# Patient Record
Sex: FEMALE | Race: WHITE | Employment: FULL TIME | URBAN - METROPOLITAN AREA
[De-identification: names, ages, dates, MRNs, and addresses within clinical notes are randomized per-mention and may not be internally consistent; named-entity substitution may affect disease eponyms.]

---

## 2017-02-15 ENCOUNTER — HOSPITAL ENCOUNTER (OUTPATIENT)
Dept: LAB | Age: 42
Discharge: HOME | End: 2017-02-15
Payer: COMMERCIAL

## 2017-02-15 ENCOUNTER — OFFICE VISIT (OUTPATIENT)
Dept: FAMILY MEDICINE CLINIC | Age: 42
End: 2017-02-15

## 2017-02-15 VITALS
DIASTOLIC BLOOD PRESSURE: 68 MMHG | BODY MASS INDEX: 40.78 KG/M2 | RESPIRATION RATE: 16 BRPM | HEIGHT: 62 IN | WEIGHT: 221.6 LBS | TEMPERATURE: 97 F | SYSTOLIC BLOOD PRESSURE: 114 MMHG | OXYGEN SATURATION: 98 % | HEART RATE: 70 BPM

## 2017-02-15 DIAGNOSIS — Z90.5 SOLITARY KIDNEY, ACQUIRED: ICD-10-CM

## 2017-02-15 DIAGNOSIS — Z51.89 ENCOUNTER FOR PATIENT COMPLIANCE MONITORING IN DRUG TREATMENT PROGRAM: Primary | ICD-10-CM

## 2017-02-15 LAB
ANION GAP BLD CALC-SCNC: 7 MMOL/L (ref 3–18)
BUN SERPL-MCNC: 15 MG/DL (ref 7–18)
BUN/CREAT SERPL: 17 (ref 12–20)
CALCIUM SERPL-MCNC: 8.5 MG/DL (ref 8.5–10.1)
CHLORIDE SERPL-SCNC: 105 MMOL/L (ref 100–108)
CO2 SERPL-SCNC: 28 MMOL/L (ref 21–32)
CREAT SERPL-MCNC: 0.89 MG/DL (ref 0.6–1.3)
GLUCOSE SERPL-MCNC: 83 MG/DL (ref 74–99)
POTASSIUM SERPL-SCNC: 3.9 MMOL/L (ref 3.5–5.5)
SODIUM SERPL-SCNC: 140 MMOL/L (ref 136–145)

## 2017-02-15 PROCEDURE — 80048 BASIC METABOLIC PNL TOTAL CA: CPT | Performed by: FAMILY MEDICINE

## 2017-02-15 PROCEDURE — 36415 COLL VENOUS BLD VENIPUNCTURE: CPT | Performed by: FAMILY MEDICINE

## 2017-02-15 RX ORDER — ALPRAZOLAM 1 MG/1
1 TABLET ORAL AS NEEDED
Qty: 30 TAB | Refills: 2 | Status: SHIPPED | OUTPATIENT
Start: 2017-02-15 | End: 2017-02-16 | Stop reason: SDUPTHER

## 2017-02-15 NOTE — PATIENT INSTRUCTIONS

## 2017-02-15 NOTE — MR AVS SNAPSHOT
Visit Information Date & Time Provider Department Dept. Phone Encounter #  
 2/15/2017 11:00 AM Israel Parra MD [address] [facility] [phone] [id number] Follow-up Instructions Return in about 3 months (around 5/15/2017), or if symptoms worsen or fail to improve. Your Appointments 4/11/2017  9:00 AM  
ESTABLISHED PATIENT with Caden Khanna MD  
[facility]. [address] ([facility]) Appt Note: 3mos f/u stress incontinence [address]  
  
   
 [facility] [phone] [id number]  
  
    
 5/16/2017 11:00 AM  
Follow Up with Israel Parra MD  
[address] [place] [address] ([facility]) Appt Note: 3 mon f/u alix 2/15  
 [address] [facility] [address] [place], [place] [id number]  
[phone]  
  
   
 [address] [address] Upcoming Health Maintenance Date Due DTaP/Tdap/Td series (1 - Tdap) 4/30/1994 PAP AKA CERVICAL CYTOLOGY 4/30/1994 INFLUENZA AGE 9 TO ADULT 8/1/2016 Allergies as of 2/15/2017  Review Complete On: 2/15/2017 By: Ab Bob LPN Severity Noted Reaction Type Reactions Morphine  09/21/2016    Other (comments) GI DISTRESS Sulfa (Sulfonamide Antibiotics)  09/21/2016    Other (comments) GI DISTRESS Sulfamethoxazole-trimethoprim  01/01/2017    Unknown (comments) Vicodin [Hydrocodone-acetaminophen]  09/21/2016    Other (comments) Psychological Reaction Current Immunizations  Never Reviewed No immunizations on file. Not reviewed this visit You Were Diagnosed With   
  
 Codes Comments Encounter for patient compliance monitoring in drug treatment program    -  Primary ICD-10-CM: Z51.89 ICD-9-CM: V72.85 Solitary kidney, acquired     ICD-10-CM: Z90.5 ICD-9-CM: V45.73 Vitals BP Pulse Temp Resp Height(growth percentile) Weight(growth percentile) 114/68 (BP 1 Location: Right arm, BP Patient Position: Sitting) 70 97 °F (36.1 °C) (Oral) 16 5' 2\" (1.575 m) 221 lb 9.6 oz (100.5 kg) SpO2 BMI OB Status Smoking Status 98% 40.53 kg/m2 Hysterectomy Never Smoker BMI and BSA Data Body Mass Index Body Surface Area 40.53 kg/m 2 2.1 m 2 Preferred Pharmacy Pharmacy Name Phone [facility] [address] [phone] Your Updated Medication List  
  
   
This list is accurate as of: 2/15/17 11:23 AM.  Always use your most recent med list.  
  
  
  
  
 albuterol 90 mcg/actuation inhaler Commonly known as:  PROVENTIL HFA, VENTOLIN HFA, PROAIR HFA  
1-2 Puffs. ALPRAZolam 1 mg tablet Commonly known as:  Jody Jace Take 1 Tab by mouth as needed. ibuprofen 600 mg tablet Commonly known as:  MOTRIN  
600 mg. Prescriptions Printed Refills ALPRAZolam (XANAX) 1 mg tablet 2 Sig: Take 1 Tab by mouth as needed. Class: Print Route: Oral  
  
We Performed the Following DRUG SCREEN, URINE [10103 CPT(R)] Follow-up Instructions Return in about 3 months (around 5/15/2017), or if symptoms worsen or fail to improve. To-Do List   
 02/15/2017 Lab:  DRUG SCREEN, URINE Patient Instructions Anxiety Disorder: Care Instructions Your Care Instructions Anxiety is a normal reaction to stress. Difficult situations can cause you to have symptoms such as sweaty palms and a nervous feeling. In an anxiety disorder, the symptoms are far more severe. Constant worry, muscle tension, trouble sleeping, nausea and diarrhea, and other symptoms can make normal daily activities difficult or impossible. These symptoms may occur for no reason, and they can affect your work, school, or social life. Medicines, counseling, and self-care can all help. Follow-up care is a key part of your treatment and safety. Be sure to make and go to all appointments, and call your doctor if you are having problems. It's also a good idea to know your test results and keep a list of the medicines you take. How can you care for yourself at home? · Take medicines exactly as directed. Call your doctor if you think you are having a problem with your medicine. · Go to your counseling sessions and follow-up appointments. · Recognize and accept your anxiety. Then, when you are in a situation that makes you anxious, say to yourself, \"This is not an emergency. I feel uncomfortable, but I am not in danger. I can keep going even if I feel anxious. \" · Be kind to your body: ¨ Relieve tension with exercise or a massage. ¨ Get enough rest. 
¨ Avoid alcohol, caffeine, nicotine, and illegal drugs. They can increase your anxiety level and cause sleep problems. ¨ Learn and do relaxation techniques. See below for more about these techniques. · Engage your mind. Get out and do something you enjoy. Go to a [employer] movie, or take a walk or hike. Plan your day. Having too much or too little to do can make you anxious. · Keep a record of your symptoms. Discuss your fears with a good friend or family member, or join a support group for people with similar problems. Talking to others sometimes relieves stress. · Get involved in social groups, or volunteer to help others. Being alone sometimes makes things seem worse than they are. · Get at least 30 minutes of exercise on most days of the week to relieve stress. Walking is a good choice. You also may want to do other activities, such as running, swimming, cycling, or playing tennis or team sports. Relaxation techniques Do relaxation exercises 10 to 20 minutes a day. You can play soothing, relaxing music while you do them, if you wish. · Tell others in your house that you are going to do your relaxation exercises. Ask them not to disturb you. · Find a comfortable place, away from all distractions and noise. · Lie down on your back, or sit with your back straight. · Focus on your breathing. Make it slow and steady. · Breathe in through your nose. Breathe out through either your nose or mouth. · Breathe deeply, filling up the area between your navel and your rib cage. Breathe so that your belly goes up and down. · Do not hold your breath. · Breathe like this for 5 to 10 minutes. Notice the feeling of calmness throughout your whole body. As you continue to breathe slowly and deeply, relax by doing the following for another 5 to 10 minutes: · Tighten and relax each muscle group in your body. You can begin at your toes and work your way up to your head. · Imagine your muscle groups relaxing and becoming heavy. · Empty your mind of all thoughts. · Let yourself relax more and more deeply. · Become aware of the state of calmness that surrounds you. · When your relaxation time is over, you can bring yourself back to alertness by moving your fingers and toes and then your hands and feet and then stretching and moving your entire body. Sometimes people fall asleep during relaxation, but they usually wake up shortly afterward. · Always give yourself time to return to full alertness before you drive a car or do anything that might cause an accident if you are not fully alert. Never play a relaxation tape while you drive a car. When should you call for help? Call 911 anytime you think you may need emergency care. For example, call if: 
· You feel you cannot stop from hurting yourself or someone else. Keep the numbers for these national suicide hotlines: 4-107-927-TALK (4-702.953.1126) and 4-095-KBVKQSH (1-348.844.9226). If you or someone you know talks about suicide or feeling hopeless, get help right away. Watch closely for changes in your health, and be sure to contact your doctor if: 
· You have anxiety or fear that affects your life. · You have symptoms of anxiety that are new or different from those you had before. Where can you learn more? Go to http://roro-kenyatta.info/. Enter P754 in the search box to learn more about \"Anxiety Disorder: Care Instructions. \" Current as of: July 26, 2016 Content Version: 11.1 © 3279-7828 [employer]. Care instructions adapted under license by [employer] (which disclaims liability or warranty for this information). If you have questions about a medical condition or this instruction, always ask your healthcare professional. [address] any warranty or liability for your use of this information. Introducing [facility]! Gaviota Bryant introduces [employer] patient portal. Now you can access parts of your medical record, email your doctor's office, and request medication refills online. 1. In your internet browser, go to [email or web address] 2. Click on the First Time User? Click Here link in the Sign In box. You will see the New Member Sign Up page. 3. Enter your [employer] Access Code exactly as it appears below. You will not need to use this code after youve completed the sign-up process. If you do not sign up before the expiration date, you must request a new code. · [employer] Access Code: [email or web address] Expires: 4/19/2017 10:37 AM 
 
4. Enter the last four digits of your Social Security Number (xxxx) and Date of Birth (mm/dd/yyyy) as indicated and click Submit. You will be taken to the next sign-up page. 5. Create a [employer] ID. This will be your [employer] login ID and cannot be changed, so think of one that is secure and easy to remember. 6. Create a [employer] password. You can change your password at any time. 7. Enter your Password Reset Question and Answer. This can be used at a later time if you forget your password. 8. Enter your e-mail address.  You will receive e-mail notification when new information is available in [employer]. 9. Click Sign Up. You can now view and download portions of your medical record. 10. Click the Download Summary menu link to download a portable copy of your medical information. If you have questions, please visit the Frequently Asked Questions section of the [employer] website. Remember, [employer] is NOT to be used for urgent needs. For medical emergencies, dial 911. Now available from your iPhone and Android! Please provide this summary of care documentation to your next provider. Your primary care clinician is listed as Avinash Small. If you have any questions after today's visit, please call [phone].

## 2017-02-15 NOTE — TELEPHONE ENCOUNTER
Pt is calling to let us know that [facility] wasn't able to fill the xanax b/c of missing directions/ dose. Please follow up with the pharmacy and the patient.

## 2017-02-15 NOTE — PROGRESS NOTES
Enio Limon is a 35 y.o.  female and presents for solitary kidney lab monitoring and a UDS for chronic rx for   A controlled substance. Chief Complaint   Patient presents with   [place] Stamp Establish Care     Subjective: Additional Concerns: none    Patient Active Problem List    Diagnosis Date Noted    Urinary incontinence without sensory awareness     Solitary kidney, acquired     Recurrent UTI     Psychiatric disorder     Microhematuria     Stress incontinence     Urinary incontinence      Current Outpatient Prescriptions   Medication Sig Dispense Refill    ALPRAZolam (XANAX) 1 mg tablet Take 1 Tab by mouth as needed. 30 Tab 2    ibuprofen (MOTRIN) 600 mg tablet 600 mg.      albuterol (PROVENTIL HFA, VENTOLIN HFA, PROAIR HFA) 90 mcg/actuation inhaler 1-2 Puffs. Allergies   Allergen Reactions    Morphine Other (comments)     GI DISTRESS       Sulfa (Sulfonamide Antibiotics) Other (comments)     GI DISTRESS     Sulfamethoxazole-Trimethoprim Unknown (comments)    Vicodin [Hydrocodone-Acetaminophen] Other (comments)     Psychological Reaction        Past Medical History   Diagnosis Date    Microhematuria     Psychiatric disorder      depression/  anxiety    Recurrent UTI     Solitary kidney, acquired     Stress incontinence     Urinary incontinence     Urinary incontinence without sensory awareness      Past Surgical History   Procedure Laterality Date    Hx nephrectomy Left 2000     pt. has right kidney    .  Hx hysterectomy  2012     partial hysterectomy    Hx  section      Hx other surgical  2013     Cholecystectomy     Family History   Problem Relation Age of Onset    Elevated Lipids Mother     Hypertension Mother     No Known Problems Father      Social History   Substance Use Topics    Smoking status: Never Smoker    Smokeless tobacco: Never Used    Alcohol use 0.6 oz/week     1 Shots of liquor per week      Comment:  rarely drink   Q 3 mos.      ROS General: negative for - chills, fatigue, fever, weight change  Psych: positive for - anxiety,  Negative for depression, irritability or mood swings  Resp: negative for - cough, shortness of breath or wheezing  CV: negative for - chest pain, edema or palpitations  MSK: negative for - joint pain, joint swelling or muscle pain  Neuro: negative for - confusion, headaches, seizures or weakness    Objective:  Vitals:    02/15/17 1057   BP: 114/68   Pulse: 70   Resp: 16   Temp: 97 °F (36.1 °C)   TempSrc: Oral   SpO2: 98%   Weight: 221 lb 9.6 oz (100.5 kg)   Height: 5' 2\" (1.575 m)   PainSc:   0 - No pain     PE    Alert, well appearing, and in no distress, oriented to person, place, and time, normal appearing weight and overweight  Mental status - alert, oriented to person, place, and time, normal mood, behavior, speech, dress, motor activity, and thought processes  Eyes - pupils equal and reactive, extraocular eye movements intact  Chest - clear to auscultation, no wheezes, rales or rhonchi, symmetric air entry  Heart - normal rate, regular rhythm, normal S1, S2, no murmurs, rubs, clicks or gallops  Extremities - peripheral pulses normal, no pedal edema, no clubbing or cyanosis    LABS   Office Visit on 01/19/2017   Component Date Value Ref Range Status    PVR 01/19/2017 0  cc Final    Color (UA POC) 01/19/2017 Yellow   Final    Clarity (UA POC) 01/19/2017 Clear   Final    Glucose (UA POC) 01/19/2017 Negative  Negative Final    Bilirubin (UA POC) 01/19/2017 Negative  Negative Final    Ketones (UA POC) 01/19/2017 Negative  Negative Final    Specific gravity (UA POC) 01/19/2017 1.020  1.001 - 1.035 Final    Blood (UA POC) 01/19/2017 2+  Negative Final    pH (UA POC) 01/19/2017 5.5  4.6 - 8.0 Final    Protein (UA POC) 01/19/2017 Negative  Negative mg/dL Final    Urobilinogen (UA POC) 01/19/2017 0.2 mg/dL  0.2 - 1 Final    Nitrites (UA POC) 01/19/2017 Negative  Negative Final    Leukocyte esterase (UA POC) 01/19/2017 Negative  Negative Final   Orders Only on 10/24/2016   Component Date Value Ref Range Status    WBC 10/24/2016 7.6  3.4 - 10.8 x10E3/uL Final    RBC 10/24/2016 4.60  x10E6/uL Final    Comment:               No patient age and/or gender provided               Age                Male          Female            0 -  7 days       3.68 - 5.77    3.68 - 5.77            8 - 30 days       3.29 - 5.50    3.29 - 5.50           31 - 90 days       2.72 - 4.84    2.72 - 4.84      91 days - 11 months     3.86 - 5.16    3.86 - 5.16            1 -  7 years      3.96 - 5.30    3.96 - 5.30            8 - 12 years      3.91 - 5.45    3.91 - 5.45               >12 years      4.14 - 5.80    3.77 - 5.28      HGB 10/24/2016 13.6  g/dL Final    Comment:               No patient age and/or gender provided               Age                Male          Female            0 -  7 days       10.7 - 20.5    10.7 - 20.5            8 - 30 days       10.5 - 18.7    10.5 - 18.7           31 - 90 days        8.8 - 14.3     8.8 - 14.3      91 days - 11 months     10.4 - 14.1    10.4 - 14.1            1 -  7 years      10.9 - 14.8    10.9 - 14.8            8 - 12 years      11.7 - 15.7    11.7 - 15.7               >12 years      12.6 - 17.7    11.1 - 15.9      HCT 10/24/2016 40.7  % Final    Comment:               No patient age and/or gender provided               Age                Male          Female            0 -  7 days       31.9 - 57.2    31.9 - 57.2            8 - 30 days       30.7 - 53.7    30.7 - 53.7           31 - 90 days       26.6 - 41.0    26.6 - 41.0      91 days - 11 months     31.0 - 41.0    31.0 - 41.0            1 -  7 years      32.4 - 43.3    32.4 - 43.3            8 - 12 years      34.8 - 45.8    34.8 - 45.8               >12 years      37.5 - 51.0    34.0 - 46.6      MCV 10/24/2016 89  fL Final    Comment:               No patient age and/or gender provided               Age                Male Female            0 -  7 days         79 - 110       79 - 110            8 - 30 days         81 - 109       81 - 109           31 - 90 days         81 -  97       81 -  97      91 days - 11 months       73 -  87       73 -  87            1 -  7 years        76 -  80       75 -  80            8 - 12 years        68 -  91       68 -  80               >12 years        78 -  97       78 -  80      [facility] 10/24/2016 29.6  pg Final    Comment:               No patient age and/or gender provided               Age                Male          Female            0 -  7 days       26.1 - 38.7    26.1 - 38.7            8 - 30 days       27.5 - 37.6    27.5 - 37.6           31 - 90 days       27.1 - 34.0    27.1 - 34.0      91 days - 11 months     24.2 - 30.1    24.2 - 30.1            1 -  7 years      24.6 - 30.7    24.6 - 30.7            8 - 12 years      25.7 - 31.5    25.7 - 31.5               >12 years      26.6 - 33.0    26.6 - 33.0      [facility] 10/24/2016 33.4  g/dL Final    Comment:               No patient age and/or gender provided               Age                Male          Female            0 -  7 days       31.9 - 36.8    31.9 - 36.8            8 - 30 days       32.0 - 36.4    32.0 - 36.4           31 - 90 days       31.9 - 36.0    31.9 - 36.0      91 days - 11 months     31.5 - 36.0    31.5 - 36.0            1 - 12 years      31.7 - 36.0    31.7 - 36.0               >12 years      31.5 - 35.7    31.5 - 35.7      RDW 10/24/2016 13.2  % Final    Comment:               No patient age and/or gender provided               Age                Male          Female            0 -  7 days       12.1 - 16.9    12.1 - 16.9            8 - 30 days       12.3 - 17.4    12.3 - 17.4           31 - 90 days       12.2 - 16.4    12.2 - 16.4      91 days - 11 months     12.2 - 15.8    12.2 - 15.8            1 -  7 years      12.3 - 15.8    12.3 - 15.8            8 - 12 years      12.3 - 15.1    12.3 - 15.1               >12 years 12.3 - 15.4    12.3 - 15.4      PLATELET 64/53/2195 598  150 - 379 x10E3/uL Final    NEUTROPHILS 10/24/2016 66  % Final    Lymphocytes 10/24/2016 25  % Final    MONOCYTES 10/24/2016 6  % Final    EOSINOPHILS 10/24/2016 2  % Final    BASOPHILS 10/24/2016 0  % Final    ABS. NEUTROPHILS 10/24/2016 5.0  x10E3/uL Final    Comment:               No patient age and/or gender provided               Age                Male          Female            0 -  7 days        1.2 -  6.1     1.2 -  6.1            8 - 30 days        1.2 -  4.8     1.2 -  4.8           31 - 90 days        0.8 -  3.8     0.8 -  3.8      91 days - 11 months      1.0 -  4.0     1.0 -  4.0            1 -  7 years       0.9 -  5.4     0.9 -  5.4            8 - 12 years       1.2 -  6.0     1.2 -  6.0               >12 years       1.4 -  7.0     1.4 -  7.0      Abs Lymphocytes 10/24/2016 1.9  x10E3/uL Final    Comment:               No patient age and/or gender provided               Age                Male          Female            0 -  7 days        0.9 -  5.0     0.9 -  5.0            8 - 30 days        0.9 -  9.1     0.9 -  9.1           31 - 90 days        1.2 -  9.2     1.2 -  9.2      91 days - 11 months      2.9 -  9.5     2.9 -  9.5            1 -  7 years       1.6 -  5.9     1.6 -  5.9            8 - 12 years       1.3 -  3.7     1.3 -  3.7               >12 years       0.7 -  3.1     0.7 -  3.1      ABS.  MONOCYTES 10/24/2016 0.4  x10E3/uL Final    Comment:               No patient age and/or gender provided               Age                Male          Female            0 -  7 days        0.2 -  1.3     0.2 -  1.3            8 - 30 days        0.1 -  1.6     0.1 -  1.6           31 - 90 days        0.2 -  1.2     0.2 -  1.2      91 days - 11 months      0.2 -  1.1     0.2 -  1.1            1 -  7 years       0.2 -  1.0     0.2 -  1.0            8 - 12 years       0.1 -  0.8     0.1 -  0.8               >12 years       0.1 -  0.9 0.1 -  0.9      ABS. EOSINOPHILS 10/24/2016 0.1  x10E3/uL Final    Comment:               No patient age and/or gender provided               Age                Male          Female            0 -  7 days        0.0 -  0.6     0.0 -  0.6            8 - 30 days        0.0 -  0.7     0.0 -  0.7      31 days - 11 months      0.0 -  0.4     0.0 -  0.4            1 -  7 years       0.0 -  0.3     0.0 -  0.3                >7 years       0.0 -  0.4     0.0 -  0.4      ABS. BASOPHILS 10/24/2016 0.0  x10E3/uL Final    Comment:               No patient age and/or gender provided               Age                Male          Female            0 -  7 days        0.0 -  0.6     0.0 -  0.6       8 days - 11 months      0.0 -  0.4     0.0 -  0.4            1 - 17 years       0.0 -  0.3     0.0 -  0.3               >17 years       0.0 -  0.2     0.0 -  0.2      IMMATURE GRANULOCYTES 10/24/2016 1  % Final    ABS. IMM. GRANS. 10/24/2016 0.1  x10E3/uL Final    Comment:               No patient age and/or gender provided               Age                Male          Female            0 - 30 days        Not Estab. Not Estab.               >30 days        0.0 -  0.1     0.0 -  0.1  A hand-written panel/profile was received from your office. In  accordance with the [facility] Ambiguous Test Code Policy dated July 1381, we have assigned CBC with Differential/Platelet, Test Code  #[id number] to this request. If this is not the testing you wished to  receive on this specimen, please contact the [facility] to clarify the test order. We  appreciate your business.       Glucose 10/24/2016 109* 65 - 99 mg/dL Final    BUN 10/24/2016 13  mg/dL Final    Comment:               No patient age and/or gender provided               Age                Male          Female            0 - 11 months        3 - 25         3 - 25            1 - 17 years         5 - 25         5 - 21           24 - 44 years 6 - 20         6 - 20           40 - 59 years         6 - 24         6 - 24           61 - 89 years         8 - 32         8 - 27               >89 years        8 - 39        10 - 39      Creatinine 10/24/2016 0.72  mg/dL Final    Comment:               No patient age and/or gender provided               Age                Male          Female            0 - 60 days        . 44 - 1.19     .44 - 1.19      61 days - 11 months      . 17 - 1.18     .17 - 1.18            1 -  2 years       . 19 -  .42     .19 -  .42            3 -  4 years       . 26 -  .51     .26 -  .51            5 -  6 years       . 30 -  .59     .30 -  .59            7 -  8 years       . 37 -  .62     .37 -  .62            9 - 10 years       . 39 -  .70     .39 -  .70           11 - 12 years       . 42 -  .75     .42 -  .75           13 - 14 years       . 49 -  .90     .49 -  .90               >14 years       . 76 - 1.27     .57 - 1.00      GFR est non-AA 10/24/2016 CANCELED  mL/min/1.73 Final-Edited    Comment: Unable to calculate GFR. Age and/or sex not provided or age <19 years  old. Result canceled by the ancillary      GFR est AA 10/24/2016 CANCELED  mL/min/1.73 Final-Edited    Comment: Unable to calculate GFR. Age and/or sex not provided or age <19 years  old.     Result canceled by the ancillary      BUN/Creatinine ratio 10/24/2016 18   Final    Comment:               No patient age and/or gender provided               Age                Male          Female            0 - 17 years         5 - 33         11 - 22           22 - 44 years         6 - 19         10 - 25           44 - 61 years         5 - 21         9 - 21               >59 years        10 - 25        6 - 32      Sodium 10/24/2016 140  136 - 144 mmol/L Final                  **Please note reference interval change**    Potassium 10/24/2016 4.1  3.5 - 5.2 mmol/L Final                  **Please note reference interval change**    Chloride 10/24/2016 102  97 - 106 mmol/L Final **Please note reference interval change**    CO2 10/24/2016 24  18 - 29 mmol/L Final    Calcium 10/24/2016 8.6  mg/dL Final    Comment:               No patient age and/or gender provided               Age                Male          Female            0 - 10 days        8.6 - 10.4     8.6 - 10.4      11 days -  1 year        9.2 - 11.0     9.2 - 11.0            2 - 11 years       9.1 - 10.5     9.1 - 10.5           12 - 17 years       8.9 - 10.4     8.9 - 10.4           18 - 59 years       8.7 - 10.2     8.7 - 10.2               >59 years       8.6 - 10.2     8.7 - 10.3      INR 10/24/2016 1.0   Final    Comment:               No patient age and/or gender provided               Age                Male          Female            0 -  3 days        0.8 -  1.5     0.8 -  1.5       4 days -  6 months      0.8 -  1.4     0.8 -  1.4                >6 months      0.8 -  1.2     0.8 -  1.2  Reference interval is for non-anticoagulated patients. Suggested INR therapeutic range for Vitamin K  antagonist therapy:     Standard Dose (moderate intensity                    therapeutic range):       2.0 - 3.0     Higher intensity therapeutic range       2.5 - 3.5      Prothrombin time 10/24/2016 10.0  sec Final    Comment:               No patient age and/or gender provided               Age                Male          Female            0 -  3 days       10.2 - 15.4    10.2 - 15.4       4 days -  6 months      9.9 - 13.6     9.9 - 13.6     7 months - 17 years       9.7 - 12.3     9.7 - 12.3               >17 years       9.1 - 12.0     9.1 - 12.0      [facility] Default 10/24/2016 Comment   Final    Comment: A hand-written panel/profile was received from your office. In  accordance with the [facility] Ambiguous Test Code Policy dated July 8245, we have completed your order by using the closest currently  or formerly recognized AMA panel.   We have assigned Basic Metabolic  Panel (8), Test Code #[id number] to this request. If this is not the  testing you wished to receive on this specimen, please contact the  [address] [place] [address] Client Inquiry/Technical Services Department to clarify the  test order. We appreciate your business.  Urine Culture, Routine 10/24/2016    Final                    Value:Culture shows less than 10,000 colony forming units of bacteria per  milliliter of urine. This colony count is not generally considered  to be clinically significant. Office Visit on 09/24/2016   Component Date Value Ref Range Status    PVR 09/24/2016 0  cc Final    Color (UA POC) 09/24/2016 Yellow   Final    Clarity (UA POC) 09/24/2016 Clear   Final    Glucose (UA POC) 09/24/2016 Negative  Negative Final    Bilirubin (UA POC) 09/24/2016 Negative  Negative Final    Ketones (UA POC) 09/24/2016 Negative  Negative Final    Specific gravity (UA POC) 09/24/2016 1.025  1.001 - 1.035 Final    Blood (UA POC) 09/24/2016 2+  Negative Final    pH (UA POC) 09/24/2016 5.5  4.6 - 8.0 Final    Protein (UA POC) 09/24/2016 Negative  Negative mg/dL Final    Urobilinogen (UA POC) 09/24/2016 0.2 mg/dL  0.2 - 1 Final    Nitrites (UA POC) 09/24/2016 Negative  Negative Final    Leukocyte esterase (UA POC) 09/24/2016 Negative  Negative Final       TESTS  Results for orders placed or performed in visit on 01/19/17   AMB POC PVR, JACEK,POST-VOID RES,US,NON-IMAGING   Result Value Ref Range    PVR 0 cc   AMB POC URINALYSIS DIP STICK AUTO W/O MICRO   Result Value Ref Range    Color (UA POC) Yellow     Clarity (UA POC) Clear     Glucose (UA POC) Negative Negative    Bilirubin (UA POC) Negative Negative    Ketones (UA POC) Negative Negative    Specific gravity (UA POC) 1.020 1.001 - 1.035    Blood (UA POC) 2+ Negative    pH (UA POC) 5.5 4.6 - 8.0    Protein (UA POC) Negative Negative mg/dL    Urobilinogen (UA POC) 0.2 mg/dL 0.2 - 1    Nitrites (UA POC) Negative Negative    Leukocyte esterase (UA POC) Negative Negative     Assessment/Plan:      1. Encounter for patient compliance monitoring in drug treatment program  - ALPRAZolam (XANAX) 1 mg tablet; Take 1 Tab by mouth as needed. Dispense: 30 Tab; Refill: 2  - DRUG SCREEN UR - W/ CONFIRM ([facility] Only); Future    2. Solitary kidney, acquired  - METABOLIC PANEL, BASIC; Future    Lab review: orders written for new lab studies as appropriate; see orders. I have discussed the diagnosis with the patient and the intended plan as seen in the above orders. The patient has received an after-visit summary and questions were answered concerning future plans. I have discussed medication side effects and warnings with the patient as well. I have reviewed the plan of care with the patient, accepted their input and they are in agreement with the treatment goals. F/U as needed. F/U in 3 months.      Shelly Chung MD.

## 2017-02-16 DIAGNOSIS — Z51.89 ENCOUNTER FOR PATIENT COMPLIANCE MONITORING IN DRUG TREATMENT PROGRAM: ICD-10-CM

## 2017-02-16 LAB
AMPHETAMINES UR QL SCN: NEGATIVE NG/ML
BARBITURATES UR QL SCN: NEGATIVE NG/ML
BENZODIAZ UR QL: NEGATIVE NG/ML
BZE UR QL: NEGATIVE NG/ML
CANNABINOIDS UR QL SCN: NEGATIVE NG/ML
DRUG SCREEN COMMENT:, 753798: NORMAL
OPIATES UR QL: NEGATIVE NG/ML
PCP UR QL: NEGATIVE NG/ML

## 2017-02-16 RX ORDER — ALPRAZOLAM 1 MG/1
1 TABLET ORAL AS NEEDED
Qty: 30 TAB | Refills: 2 | Status: SHIPPED | OUTPATIENT
Start: 2017-02-16 | End: 2017-05-12 | Stop reason: SDUPTHER

## 2017-05-05 ENCOUNTER — OFFICE VISIT (OUTPATIENT)
Dept: FAMILY MEDICINE CLINIC | Age: 42
End: 2017-05-05

## 2017-05-05 ENCOUNTER — HOSPITAL ENCOUNTER (OUTPATIENT)
Dept: LAB | Age: 42
Discharge: HOME | End: 2017-05-05
Payer: COMMERCIAL

## 2017-05-05 VITALS
WEIGHT: 223 LBS | HEIGHT: 62 IN | HEART RATE: 93 BPM | RESPIRATION RATE: 18 BRPM | TEMPERATURE: 96.7 F | DIASTOLIC BLOOD PRESSURE: 73 MMHG | OXYGEN SATURATION: 98 % | BODY MASS INDEX: 41.04 KG/M2 | SYSTOLIC BLOOD PRESSURE: 115 MMHG

## 2017-05-05 DIAGNOSIS — R63.5 WEIGHT GAIN: Primary | ICD-10-CM

## 2017-05-05 DIAGNOSIS — R63.5 WEIGHT GAIN: ICD-10-CM

## 2017-05-05 DIAGNOSIS — Z13.220 SCREENING CHOLESTEROL LEVEL: ICD-10-CM

## 2017-05-05 LAB
BASOPHILS # BLD AUTO: 0 K/UL (ref 0–0.06)
BASOPHILS # BLD: 0 % (ref 0–2)
CHOLEST SERPL-MCNC: 172 MG/DL
DIFFERENTIAL METHOD BLD: ABNORMAL
EOSINOPHIL # BLD: 0.2 K/UL (ref 0–0.4)
EOSINOPHIL NFR BLD: 2 % (ref 0–5)
ERYTHROCYTE [DISTWIDTH] IN BLOOD BY AUTOMATED COUNT: 13.4 % (ref 11.6–14.5)
EST. AVERAGE GLUCOSE BLD GHB EST-MCNC: 114 MG/DL
HBA1C MFR BLD: 5.6 % (ref 4.2–5.6)
HCT VFR BLD AUTO: 48.4 % (ref 35–45)
HDLC SERPL-MCNC: 43 MG/DL (ref 40–60)
HDLC SERPL: 4 {RATIO} (ref 0–5)
HGB BLD-MCNC: 16.1 G/DL (ref 12–16)
LDLC SERPL CALC-MCNC: 89.4 MG/DL (ref 0–100)
LIPID PROFILE,FLP: ABNORMAL
LYMPHOCYTES # BLD AUTO: 22 % (ref 21–52)
LYMPHOCYTES # BLD: 2.9 K/UL (ref 0.9–3.6)
MCH RBC QN AUTO: 29.8 PG (ref 24–34)
MCHC RBC AUTO-ENTMCNC: 33.3 G/DL (ref 31–37)
MCV RBC AUTO: 89.5 FL (ref 74–97)
MONOCYTES # BLD: 0.4 K/UL (ref 0.05–1.2)
MONOCYTES NFR BLD AUTO: 3 % (ref 3–10)
NEUTS SEG # BLD: 9.4 K/UL (ref 1.8–8)
NEUTS SEG NFR BLD AUTO: 73 % (ref 40–73)
PLATELET # BLD AUTO: 273 K/UL (ref 135–420)
PMV BLD AUTO: 10 FL (ref 9.2–11.8)
RBC # BLD AUTO: 5.41 M/UL (ref 4.2–5.3)
TRIGL SERPL-MCNC: 198 MG/DL (ref ?–150)
TSH SERPL DL<=0.05 MIU/L-ACNC: 1.75 UIU/ML (ref 0.36–3.74)
VLDLC SERPL CALC-MCNC: 39.6 MG/DL
WBC # BLD AUTO: 13 K/UL (ref 4.6–13.2)

## 2017-05-05 PROCEDURE — 85025 COMPLETE CBC W/AUTO DIFF WBC: CPT | Performed by: FAMILY MEDICINE

## 2017-05-05 PROCEDURE — 80061 LIPID PANEL: CPT | Performed by: FAMILY MEDICINE

## 2017-05-05 PROCEDURE — 83036 HEMOGLOBIN GLYCOSYLATED A1C: CPT | Performed by: FAMILY MEDICINE

## 2017-05-05 PROCEDURE — 84443 ASSAY THYROID STIM HORMONE: CPT | Performed by: FAMILY MEDICINE

## 2017-05-05 NOTE — PROGRESS NOTES
1. Have you been to the ER, urgent care clinic since your last visit? Hospitalized since your last visit?no    2. Have you seen or consulted any other health care providers outside of the [address] [place] [address] since your last visit? Include any pap smears or colon screening. No    Patient Royer Griggs is a 38 y.o. female presents today for weight management.

## 2017-05-05 NOTE — PATIENT INSTRUCTIONS
Abnormal Weight Gain: Care Instructions  Your Care Instructions  There are two types of weight gain--normal and abnormal. Normal weight gain is usually caused by eating too much or exercising too little. It can also happen as you get older. But abnormal weight gain has other causes. It can be caused by a problem with your thyroid gland, called hypothyroidism. Or it can be caused by a problem with your adrenal glands, called Cushing's syndrome. Or your body could be holding too much fluid because of kidney, liver, or heart problems. In some cases, a medicine you take can cause you to gain weight. You can work with your doctor to find out the cause of your weight gain. You will probably need tests to do this. Follow-up care is a key part of your treatment and safety. Be sure to make and go to all appointments, and call your doctor if you are having problems. It's also a good idea to know your test results and keep a list of the medicines you take. How can you care for yourself at home? · Weigh yourself at the same time every day. It's best to do it first thing in the morning after you empty your bladder. Be sure to always wear the same amount of clothing. · Write down any changes in your weight and the possible causes. Discuss these with your doctor. · Your doctor may want you to change your diet and exercise habits. A good way to lose weight is to reduce calories and increase exercise. · Walking is an easy way to get exercise. Try to walk a little longer every day. You also may want to swim, bike, or do other activities. · Ask your doctor if you should see a dietitian. This is a person who can help you plan meals that work best for your lifestyle. · If your doctor prescribed medicines, take them exactly as prescribed. Call your doctor if you think you are having a problem with your medicine. You will get more details on the specific medicines your doctor prescribes.   When should you call for help?  Watch closely for changes in your health, and be sure to contact your doctor if:  · You do not get better as expected. · You continue to gain weight. Where can you learn more? Go to http://roro-kenyatta.info/. Enter A175 in the search box to learn more about \"Abnormal Weight Gain: Care Instructions. \"  Current as of: October 13, 2016  Content Version: 11.2  © 6486-4404 [employer]. Care instructions adapted under license by [employer] (which disclaims liability or warranty for this information). If you have questions about a medical condition or this instruction, always ask your healthcare professional. [address] any warranty or liability for your use of this information.

## 2017-05-07 NOTE — PROGRESS NOTES
Pls report normal to near normal labs to patient except trig which prob only  Needs some diet and exercise to bring back to normal. F/U FLP in 3-6 months.

## 2017-05-07 NOTE — PROGRESS NOTES
Ronda Gilliland is a 38 y.o. female and presents with weight gain. No metabolic work up yet. No weight loss plan tried yet  Except weight watchers. Chief Complaint   Patient presents with    Weight Loss     Subjective: Additional Concerns: none    Patient Active Problem List    Diagnosis Date Noted    Urinary incontinence without sensory awareness     Solitary kidney, acquired     Recurrent UTI     Psychiatric disorder     Microhematuria     Stress incontinence     Urinary incontinence      Current Outpatient Prescriptions   Medication Sig Dispense Refill    ALPRAZolam (XANAX) 1 mg tablet Take 1 Tab by mouth as needed. Max Daily Amount: 1 mg. 30 Tab 2    ibuprofen (MOTRIN) 600 mg tablet 600 mg.      albuterol (PROVENTIL HFA, VENTOLIN HFA, PROAIR HFA) 90 mcg/actuation inhaler 1-2 Puffs. Allergies   Allergen Reactions    Morphine Other (comments)     GI DISTRESS       Sulfa (Sulfonamide Antibiotics) Other (comments)     GI DISTRESS     Sulfamethoxazole-Trimethoprim Unknown (comments)    Vicodin [Hydrocodone-Acetaminophen] Other (comments)     Psychological Reaction        Past Medical History:   Diagnosis Date    Microhematuria     Psychiatric disorder     depression/  anxiety    Recurrent UTI     Solitary kidney, acquired     Stress incontinence     Urinary incontinence     Urinary incontinence without sensory awareness      Past Surgical History:   Procedure Laterality Date    HX  SECTION      HX HYSTERECTOMY  2012    partial hysterectomy    HX HYSTERECTOMY      HX NEPHRECTOMY Left 2000    pt. has right kidney    .     HX NEPHRECTOMY      HX OTHER SURGICAL  2013    Cholecystectomy     Family History   Problem Relation Age of Onset    Elevated Lipids Mother     Hypertension Mother     No Known Problems Father      Social History   Substance Use Topics    Smoking status: Never Smoker    Smokeless tobacco: Never Used    Alcohol use 0.6 oz/week     1 Shots of liquor per week      Comment:  rarely drink   Q 3 mos. ROS     General: negative for - chills, fatigue, fever, positive weight gain change  Resp: negative for - cough, shortness of breath or wheezing  CV: negative for - chest pain, edema or palpitations  GI: negative for - abdominal pain, change in bowel habits, constipation, diarrhea or nausea/vomiting    Objective:  Vitals:    05/05/17 1122   BP: 115/73   Pulse: 93   Resp: 18   Temp: 96.7 °F (35.9 °C)   TempSrc: Oral   SpO2: 98%   Weight: 223 lb (101.2 kg)   Height: 5' 2.01\" (1.575 m)   PainSc:   0 - No pain     PE    Alert, well appearing, and in no distress, oriented to person, place, and time and overweight  Mental status - alert, oriented to person, place, and time, normal mood, behavior, speech, dress, motor activity, and thought processes  Chest - clear to auscultation, no wheezes, rales or rhonchi, symmetric air entry  Heart - normal rate, regular rhythm, normal S1, S2, no murmurs, rubs, clicks or gallops  Extremities - peripheral pulses normal, no pedal edema, no clubbing or cyanosis    [facility] Outpatient Visit on 05/05/2017   Component Date Value Ref Range Status    LIPID PROFILE 05/05/2017        Final    Cholesterol, total 05/05/2017 172  <200 MG/DL Final    Triglyceride 05/05/2017 198* <150 MG/DL Final    Comment: The drugs N-acetylcysteine (NAC) and  Metamiszole have been found to cause falsely  low results in this chemical assay. Please  be sure to submit blood samples obtained  BEFORE administration of either of these  drugs to assure correct results.       HDL Cholesterol 05/05/2017 43  40 - 60 MG/DL Final    LDL, calculated 05/05/2017 89.4  0 - 100 MG/DL Final    VLDL, calculated 05/05/2017 39.6  MG/DL Final    CHOL/HDL Ratio 05/05/2017 4.0  0 - 5.0   Final    WBC 05/05/2017 13.0  4.6 - 13.2 K/uL Final    RBC 05/05/2017 5.41* 4.20 - 5.30 M/uL Final    HGB 05/05/2017 16.1* 12.0 - 16.0 g/dL Final    HCT 05/05/2017 48.4* 35.0 - 45.0 % Final    MCV 05/05/2017 89.5  74.0 - 97.0 FL Final    MCH 05/05/2017 29.8  24.0 - 34.0 PG Final    MCHC 05/05/2017 33.3  31.0 - 37.0 g/dL Final    RDW 05/05/2017 13.4  11.6 - 14.5 % Final    PLATELET 58/80/6371 523  135 - 420 K/uL Final    MPV 05/05/2017 10.0  9.2 - 11.8 FL Final    NEUTROPHILS 05/05/2017 73  40 - 73 % Final    LYMPHOCYTES 05/05/2017 22  21 - 52 % Final    MONOCYTES 05/05/2017 3  3 - 10 % Final    EOSINOPHILS 05/05/2017 2  0 - 5 % Final    BASOPHILS 05/05/2017 0  0 - 2 % Final    ABS. NEUTROPHILS 05/05/2017 9.4* 1.8 - 8.0 K/UL Final    ABS. LYMPHOCYTES 05/05/2017 2.9  0.9 - 3.6 K/UL Final    ABS. MONOCYTES 05/05/2017 0.4  0.05 - 1.2 K/UL Final    ABS. EOSINOPHILS 05/05/2017 0.2  0.0 - 0.4 K/UL Final    ABS. BASOPHILS 05/05/2017 0.0  0.0 - 0.06 K/UL Final    DF 05/05/2017 AUTOMATED    Final    TSH 05/05/2017 1.75  0.36 - 3.74 uIU/mL Final    Hemoglobin A1c 05/05/2017 5.6  4.2 - 5.6 % Final    Comment: (NOTE)  HbA1C Interpretive Ranges  <5.7              Normal  5.7 - 6.4         Consider Prediabetes  >6.5              Consider Diabetes      Est. average glucose 05/05/2017 114  mg/dL Final    Comment: (NOTE)  The eAG should be interpreted with patient characteristics in mind   since ethnicity, interindividual differences, red cell lifespan,   variation in rates of glycation, etc. may affect the validity of the   calculation.      Office Visit on 04/28/2017   Component Date Value Ref Range Status    Color (UA POC) 04/28/2017 Yellow   Final    Clarity (UA POC) 04/28/2017 Clear   Final    Glucose (UA POC) 04/28/2017 Negative  Negative Final    Bilirubin (UA POC) 04/28/2017 Negative  Negative Final    Ketones (UA POC) 04/28/2017 Negative  Negative Final    Specific gravity (UA POC) 04/28/2017 1.020  1.001 - 1.035 Final    Blood (UA POC) 04/28/2017 3+  Negative Final    Moderate    pH (UA POC) 04/28/2017 5.5  4.6 - 8.0 Final    Protein (UA POC) 04/28/2017 Negative  Negative mg/dL Final    Urobilinogen (UA POC) 04/28/2017 0.2 mg/dL  0.2 - 1 Final    Nitrites (UA POC) 04/28/2017 Negative  Negative Final    Leukocyte esterase (UA POC) 04/28/2017 Negative  Negative Final    PVR 04/28/2017 127  f cc Final   Hospital Outpatient Visit on 02/15/2017   Component Date Value Ref Range Status    Sodium 02/15/2017 140  136 - 145 mmol/L Final    Potassium 02/15/2017 3.9  3.5 - 5.5 mmol/L Final    Chloride 02/15/2017 105  100 - 108 mmol/L Final    CO2 02/15/2017 28  21 - 32 mmol/L Final    Anion gap 02/15/2017 7  3.0 - 18 mmol/L Final    Glucose 02/15/2017 83  74 - 99 mg/dL Final    BUN 02/15/2017 15  7.0 - 18 MG/DL Final    Creatinine 02/15/2017 0.89  0.6 - 1.3 MG/DL Final    BUN/Creatinine ratio 02/15/2017 17  12 - 20   Final    GFR est AA 02/15/2017 >60  >60 ml/min/1.73m2 Final    GFR est non-AA 02/15/2017 >60  >60 ml/min/1.73m2 Final    Comment: (NOTE)  Estimated GFR is calculated using the Modification of Diet in Renal   Disease (MDRD) Study equation, reported for both  Americans   (GFRAA) and non- Americans (GFRNA), and normalized to 1.73m2   body surface area. The physician must decide which value applies to   the patient. The MDRD study equation should only be used in   individuals age 25 or older. It has not been validated for the   following: pregnant women, patients with serious comorbid conditions,   or on certain medications, or persons with extremes of body size,   muscle mass, or nutritional status.  Calcium 02/15/2017 8.5  8.5 - 10.1 MG/DL Final   Office Visit on 02/15/2017   Component Date Value Ref Range Status    Amphetamines, urine 02/15/2017 Negative  Nwehvt=2861 ng/mL Final    Amphetamine test includes Amphetamine and Methamphetamine.     Barbiturates 02/15/2017 Negative  Mqiqki=225 ng/mL Final    Benzodiazepines 02/15/2017 Negative  Aakzmb=870 ng/mL Final    Cannabinoids 02/15/2017 Negative  Cutoff=50 ng/mL Final    Cocaine 02/15/2017 Negative  Jbbaxo=318 ng/mL Final    Opiates 02/15/2017 Negative  Cgejuz=349 ng/mL Final    Opiate test includes Codeine and Morphine only.  Phencyclidine 02/15/2017 Negative  Cutoff=25 ng/mL Final    Drug Screen Comment: 02/15/2017 Comment   Final    Comment: This assay provides a preliminary unconfirmed analytical test result  that may be suitable for the clinical management of patients in  certain situations. For workplace drug testing programs, preliminary  positive findings should always be confirmed by an alternative method. Some over-the-counter medications, as well as adulterants, may cause  inaccurate results. Screen Only testing does not meet the [employer] of  [employer] Forensic Urine Drug Testing Program  requirements as a forensic urine drug test for workplace testing. All  clients must ensure that their testing program conforms to applicable  state and federal laws and employment agreements.      Office Visit on 01/19/2017   Component Date Value Ref Range Status    PVR 01/19/2017 0  cc Final    Color (UA POC) 01/19/2017 Yellow   Final    Clarity (UA POC) 01/19/2017 Clear   Final    Glucose (UA POC) 01/19/2017 Negative  Negative Final    Bilirubin (UA POC) 01/19/2017 Negative  Negative Final    Ketones (UA POC) 01/19/2017 Negative  Negative Final    Specific gravity (UA POC) 01/19/2017 1.020  1.001 - 1.035 Final    Blood (UA POC) 01/19/2017 2+  Negative Final    pH (UA POC) 01/19/2017 5.5  4.6 - 8.0 Final    Protein (UA POC) 01/19/2017 Negative  Negative mg/dL Final    Urobilinogen (UA POC) 01/19/2017 0.2 mg/dL  0.2 - 1 Final    Nitrites (UA POC) 01/19/2017 Negative  Negative Final    Leukocyte esterase (UA POC) 01/19/2017 Negative  Negative Final       TESTS  Results for orders placed or performed in visit on 04/28/17   AMB POC URINALYSIS DIP STICK AUTO W/O MICRO   Result Value Ref Range    Color (UA POC) Yellow     Clarity (UA POC) Clear     Glucose (UA POC) Negative Negative    Bilirubin (UA POC) Negative Negative    Ketones (UA POC) Negative Negative    Specific gravity (UA POC) 1.020 1.001 - 1.035    Blood (UA POC) 3+ Negative    pH (UA POC) 5.5 4.6 - 8.0    Protein (UA POC) Negative Negative mg/dL    Urobilinogen (UA POC) 0.2 mg/dL 0.2 - 1    Nitrites (UA POC) Negative Negative    Leukocyte esterase (UA POC) Negative Negative   AMB POC PVR, JACEK,POST-VOID RES,US,NON-IMAGING   Result Value Ref Range     f cc     Assessment/Plan:      1. Weight gain - Patient given info about PT and medical weigh loss management to include online resources that are free for weight loss. Patient will give us a call if she would like a referral afte calling them first to gather info and ask questions. - CBC WITH AUTOMATED DIFF; Future  - TSH 3RD GENERATION; Future  - HEMOGLOBIN A1C WITH EAG; Future    2. Screening cholesterol level  - LIPID PANEL; Future    Lab review: orders written for new lab studies as appropriate; see orders. I have discussed the diagnosis with the patient and the intended plan as seen in the above orders. The patient has received an after-visit summary and questions were answered concerning future plans. I have discussed medication side effects and warnings with the patient as well. I have reviewed the plan of care with the patient, accepted their input and they are in agreement with the treatment goals. F/U as needed.      Murali Viveros MD

## 2017-05-09 ENCOUNTER — OFFICE VISIT (OUTPATIENT)
Dept: FAMILY MEDICINE CLINIC | Age: 42
End: 2017-05-09

## 2017-05-09 VITALS
TEMPERATURE: 96.9 F | SYSTOLIC BLOOD PRESSURE: 120 MMHG | WEIGHT: 226 LBS | HEIGHT: 62 IN | HEART RATE: 97 BPM | BODY MASS INDEX: 41.59 KG/M2 | RESPIRATION RATE: 17 BRPM | OXYGEN SATURATION: 95 % | DIASTOLIC BLOOD PRESSURE: 70 MMHG

## 2017-05-09 DIAGNOSIS — E66.01 MORBID OBESITY WITH BMI OF 40.0-44.9, ADULT (HCC): Primary | ICD-10-CM

## 2017-05-09 DIAGNOSIS — F41.9 ANXIETY: ICD-10-CM

## 2017-05-09 NOTE — PATIENT INSTRUCTIONS
FOR MEDICALLY MANAGED WEIGHT LOSS:   REPORT TO ORIENTATION BACK AT THIS CLINIC June 1ST 2017  CALL PHONE NUMBER ON CARD PROVIDED FOR MORE INFO ON PROGRAM    ANXIETY:  Consider cognitive behavioral therapy for treatment of anxiety     Consider getting \"When Panic Attacks\" by Dr. Jacey Cerda for an introduction into this type of therapy     DUE FOR PAP SMEAR, SCHEDULE WITH PCP DR. HAQ    Anxiety Disorder: Care Instructions  Your Care Instructions  Anxiety is a normal reaction to stress. Difficult situations can cause you to have symptoms such as sweaty palms and a nervous feeling. In an anxiety disorder, the symptoms are far more severe. Constant worry, muscle tension, trouble sleeping, nausea and diarrhea, and other symptoms can make normal daily activities difficult or impossible. These symptoms may occur for no reason, and they can affect your work, school, or social life. Medicines, counseling, and self-care can all help. Follow-up care is a key part of your treatment and safety. Be sure to make and go to all appointments, and call your doctor if you are having problems. It's also a good idea to know your test results and keep a list of the medicines you take. How can you care for yourself at home? · Take medicines exactly as directed. Call your doctor if you think you are having a problem with your medicine. · Go to your counseling sessions and follow-up appointments. · Recognize and accept your anxiety. Then, when you are in a situation that makes you anxious, say to yourself, \"This is not an emergency. I feel uncomfortable, but I am not in danger. I can keep going even if I feel anxious. \"  · Be kind to your body:  ¨ Relieve tension with exercise or a massage. ¨ Get enough rest.  ¨ Avoid alcohol, caffeine, nicotine, and illegal drugs. They can increase your anxiety level and cause sleep problems. ¨ Learn and do relaxation techniques. See below for more about these techniques. · Engage your mind.  Get out and do something you enjoy. Go to a funny movie, or take a walk or hike. Plan your day. Having too much or too little to do can make you anxious. · Keep a record of your symptoms. Discuss your fears with a good friend or family member, or join a support group for people with similar problems. Talking to others sometimes relieves stress. · Get involved in social groups, or volunteer to help others. Being alone sometimes makes things seem worse than they are. · Get at least 30 minutes of exercise on most days of the week to relieve stress. Walking is a good choice. You also may want to do other activities, such as running, swimming, cycling, or playing tennis or team sports. Relaxation techniques  Do relaxation exercises 10 to 20 minutes a day. You can play soothing, relaxing music while you do them, if you wish. · Tell others in your house that you are going to do your relaxation exercises. Ask them not to disturb you. · Find a comfortable place, away from all distractions and noise. · Lie down on your back, or sit with your back straight. · Focus on your breathing. Make it slow and steady. · Breathe in through your nose. Breathe out through either your nose or mouth. · Breathe deeply, filling up the area between your navel and your rib cage. Breathe so that your belly goes up and down. · Do not hold your breath. · Breathe like this for 5 to 10 minutes. Notice the feeling of calmness throughout your whole body. As you continue to breathe slowly and deeply, relax by doing the following for another 5 to 10 minutes:  · Tighten and relax each muscle group in your body. You can begin at your toes and work your way up to your head. · Imagine your muscle groups relaxing and becoming heavy. · Empty your mind of all thoughts. · Let yourself relax more and more deeply. · Become aware of the state of calmness that surrounds you.   · When your relaxation time is over, you can bring yourself back to alertness by moving your fingers and toes and then your hands and feet and then stretching and moving your entire body. Sometimes people fall asleep during relaxation, but they usually wake up shortly afterward. · Always give yourself time to return to full alertness before you drive a car or do anything that might cause an accident if you are not fully alert. Never play a relaxation tape while you drive a car. When should you call for help? Call 911 anytime you think you may need emergency care. For example, call if:  · You feel you cannot stop from hurting yourself or someone else. Keep the numbers for these national suicide hotlines: 2-074-663-TALK (7-208.557.4474) and 5-154-PSCVMNH (2-250.779.3311). If you or someone you know talks about suicide or feeling hopeless, get help right away. Watch closely for changes in your health, and be sure to contact your doctor if:  · You have anxiety or fear that affects your life. · You have symptoms of anxiety that are new or different from those you had before. Where can you learn more? Go to http://roro-kenyatta.info/. Enter P754 in the search box to learn more about \"Anxiety Disorder: Care Instructions. \"  Current as of: July 26, 2016  Content Version: 11.2  © 0076-5073 [employer]. Care instructions adapted under license by [employer] (which disclaims liability or warranty for this information). If you have questions about a medical condition or this instruction, always ask your healthcare professional. [place] [address] any warranty or liability for your use of this information.

## 2017-05-09 NOTE — PROGRESS NOTES
INTERNAL MEDICINE INITIAL PROVIDER VISIT    SUBJECTIVE:     Chief Complaint   Patient presents with    Weight Loss       HPI: 38 y.o. female with PMHx significant for morbid obesity is here for the above chief complaint(s). Wt loss: interested in medically mgmt wt loss program. Understands process includes coming here, discussed importance of coming to orientation. Being able to \"stick with it\" is biggest fear. No med mgmt wt loss in past. Did weight watchers in past and trials of phen phen. Anxiety/Depressoin: Taking xanax PRN 0.5 mg twice to three times a week. So far helpful in controlling anxiety, with 1 tab get sleepy. Most concerning area of symptoms related to \" worrying too much about different things. \" Feel \"good\" today. Got sleep this weekend. No depressions or thoughts of suicide.  pased away 9/2015. Has support. ROS: 7 point systems review negative except in HPI. Current Outpatient Prescriptions   Medication Sig    ALPRAZolam (XANAX) 1 mg tablet Take 1 Tab by mouth as needed. Max Daily Amount: 1 mg.  ibuprofen (MOTRIN) 600 mg tablet 600 mg.    albuterol (PROVENTIL HFA, VENTOLIN HFA, PROAIR HFA) 90 mcg/actuation inhaler 1-2 Puffs. No current facility-administered medications for this visit.       Health Maintenance   Topic Date Due    DTaP/Tdap/Td series (1 - Tdap) 04/30/1994    PAP AKA CERVICAL CYTOLOGY  04/30/1994    INFLUENZA AGE 9 TO ADULT  08/01/2017     Medications and Allergies: Reviewed and confirmed in the chart    Past Medical Hx: Reviewed and confirmed in the chart  Past Medical History:   Diagnosis Date    Microhematuria     Psychiatric disorder     depression/  anxiety    Recurrent UTI     Solitary kidney, acquired     Stress incontinence     Urinary incontinence     Urinary incontinence without sensory awareness      Family Hx: Reviewed and updated in EMR  Social Hx: Reviewed and updated in EMR    OBJECTIVE:  Vitals:    05/09/17 1415   BP: 120/70 Pulse: 97   Resp: 17   Temp: 96.9 °F (36.1 °C)   TempSrc: Oral   SpO2: 95%   Weight: 226 lb (102.5 kg)   Height: 5' 2\" (1.575 m)   General: Pleasant adult woman in no acute distress  HEENT: Head is atraumatic normo-cephalic. Neuro: Alert and oriented to person, place, time and situation. MSE: Mood euthymic with affect congruent and reactive. Speech normal tone, volume and rate. Good GH. No PMA/R. TP linear with content focused on HPI. Cognition grossly intact, not formally assessed. No SI/HI. Fair insight and judgment. MARTIN-7: 7, not difficult at all  PHQ-9: 5, not difficult at all      [address],  [address] ICD-9-CM    1. Morbid obesity with BMI of 40.0-44.9, adult ([facility] [address] E66.01 278.01 - given contact of  for medical wt loss  - Encouraged to attend orientation June 1st at this clinic    Z68.41 V85.41    2. Anxiety F41.9 300.00 - continue current mgmt  - encouraged CBT therapy, handout given  - encouraged \"when panic attacks\" by Dr. Gordo Hunt, reading on CBT and anxiety  - f/u with PCP as scheduled     3. Future Appointments  Date Time Provider [facility]   5/12/2017 9:30 AM Marielos Rodríguez  N [facility]   5/26/2017 9:00 AM [facility]it Morrissey, [address] [facility]   6/1/2017 10:00 AM ORIENTATION A [facility] SCHED   6/16/2017 10:00 AM Charanjit Morrissey, PT [facility] SCHED   10/27/2017 10:00 AM Mariam Robertson  [address]     4. AVS printed and provided to patient    5. Assessment and plan above discussed with patient, patient voiced understanding and agreement with plan. Bishop Landa M.D.   [address], [address] [place] [address], [address] [place] [address] - [phone]  [place] [phone]

## 2017-05-09 NOTE — MR AVS SNAPSHOT
Visit Information Date & Time Provider Department Dept. Phone Encounter #  
 5/9/2017  3:00 PM Bishop Landa MD [address] [facility] [phone] [id number] Follow-up Instructions Return in about 3 months (around 8/9/2017) for PAP smear [facility] PCP. Routing History Your Appointments 5/9/2017  3:00 PM  
New Patient with Bishop Landa MD  
[address] [facility] [address] [facility]) Appt Note: Est care [address] [facility] [address] [address]  
[phone]  
  
   
 [address]  
  
    
 5/12/2017  9:30 AM  
Follow Up with Marielos Rodríguez MD  
[employer] ([address] [facility]) Appt Note: 3 mon f/u alix 2/15; r/s from 05/16  
 [address] [facility] [address] [facility] [address]  
[phone]  
  
   
 [address] [address]  
  
    
 5/26/2017  9:00 AM  
New Patient with Charanjit Morrissey, PT [facility]. [address] ([address] [facility]) Appt Note: NP - BCBS - NPPW GIVEN  
 [address] [place] [address] [address] [facility] [phone]  
  
    
 6/16/2017 10:00 AM  
PHYSICAL THERAPY with Charanjit Morrissey, PT [facility]. [address] ([address]) Appt Note: BCBS  
 [address] [facility] [address] [place] [address] [address] [phone]  
  
    
 10/27/2017 10:00 AM  
ESTABLISHED PATIENT with Mariam Robertson MD  
[facility]. [address] [place] [address]) Appt Note: 6MO F/UP STRESS INCONT  
 [address] [facility] [address] [place] [address]  
[phone]  
  
   
 [facility] 68 [phone] Upcoming Health Maintenance Date Due DTaP/Tdap/Td series (1 - Tdap) 4/30/1994 PAP AKA CERVICAL CYTOLOGY 4/30/1994 INFLUENZA AGE 9 TO ADULT 8/1/2017 Allergies as of 5/9/2017  Review Complete On: 5/9/2017 By: Juan Francisco Dykes MD  
  
 Severity Noted Reaction Type Reactions Morphine  09/21/2016    Other (comments) GI DISTRESS Sulfa (Sulfonamide Antibiotics)  09/21/2016    Other (comments) GI DISTRESS Sulfamethoxazole-trimethoprim  01/01/2017    Unknown (comments) Vicodin [Hydrocodone-acetaminophen]  09/21/2016    Other (comments) Psychological Reaction Current Immunizations  Never Reviewed No immunizations on file. Not reviewed this visit You Were Diagnosed With   
  
 Codes Comments Morbid obesity with BMI of 40.0-44.9, adult ([facility]    -  Primary ICD-10-CM: E66.01, Z68.41 
ICD-9-CM: 278.01, V85.41 Anxiety     ICD-10-CM: F41.9 ICD-9-CM: 300.00 Vitals BP Pulse Temp Resp Height(growth percentile) Weight(growth percentile) 120/70 (BP 1 Location: Left arm, BP Patient Position: Sitting) 97 96.9 °F (36.1 °C) (Oral) 17 5' 2\" (1.575 m) 226 lb (102.5 kg) SpO2 BMI OB Status Smoking Status 95% 41.34 kg/m2 Hysterectomy Never Smoker Vitals History BMI and BSA Data Body Mass Index Body Surface Area  
 41.34 kg/m 2 2.12 m 2 Preferred Pharmacy Pharmacy Name Phone [facility] [address] [facility] [phone] Your Updated Medication List  
  
   
This list is accurate as of: 5/9/17  2:41 PM.  Always use your most recent med list.  
  
  
  
  
 albuterol 90 mcg/actuation inhaler Commonly known as:  PROVENTIL HFA, VENTOLIN HFA, PROAIR HFA  
1-2 Puffs. ALPRAZolam 1 mg tablet Commonly known as:  Matthew Osei Take 1 Tab by mouth as needed. Max Daily Amount: 1 mg.  
  
 ibuprofen 600 mg tablet Commonly known as:  MOTRIN  
600 mg. Follow-up Instructions Return in about 3 months (around 8/9/2017) for PAP smear [facility] PCP. Patient Instructions FOR MEDICALLY MANAGED WEIGHT LOSS:  
 REPORT TO ORIENTATION BACK AT THIS CLINIC June 1ST 2017 CALL PHONE NUMBER ON CARD PROVIDED FOR MORE INFO ON PROGRAM 
 
ANXIETY: 
Consider cognitive behavioral therapy for treatment of anxiety Consider getting \"When Panic Attacks\" by Dr. Gissel Raphael for an introduction into this type of therapy DUE FOR PAP SMEAR, SCHEDULE WITH PCP DR. Sulema Franco Anxiety Disorder: Care Instructions Your Care Instructions Anxiety is a normal reaction to stress. Difficult situations can cause you to have symptoms such as sweaty palms and a nervous feeling. In an anxiety disorder, the symptoms are far more severe. Constant worry, muscle tension, trouble sleeping, nausea and diarrhea, and other symptoms can make normal daily activities difficult or impossible. These symptoms may occur for no reason, and they can affect your work, school, or social life. Medicines, counseling, and self-care can all help. Follow-up care is a key part of your treatment and safety. Be sure to make and go to all appointments, and call your doctor if you are having problems. It's also a good idea to know your test results and keep a list of the medicines you take. How can you care for yourself at home? · Take medicines exactly as directed. Call your doctor if you think you are having a problem with your medicine. · Go to your counseling sessions and follow-up appointments. · Recognize and accept your anxiety. Then, when you are in a situation that makes you anxious, say to yourself, \"This is not an emergency. I feel uncomfortable, but I am not in danger. I can keep going even if I feel anxious. \" · Be kind to your body: ¨ Relieve tension with exercise or a massage. ¨ Get enough rest. 
¨ Avoid alcohol, caffeine, nicotine, and illegal drugs. They can increase your anxiety level and cause sleep problems. ¨ Learn and do relaxation techniques. See below for more about these techniques. · Engage your mind. Get out and do something you enjoy. Go to a funny movie, or take a walk or hike. Plan your day. Having too much or too little to do can make you anxious. · Keep a record of your symptoms. Discuss your fears with a good friend or family member, or join a support group for people with similar problems. Talking to others sometimes relieves stress. · Get involved in social groups, or volunteer to help others. Being alone sometimes makes things seem worse than they are. · Get at least 30 minutes of exercise on most days of the week to relieve stress. Walking is a good choice. You also may want to do other activities, such as running, swimming, cycling, or playing tennis or team sports. Relaxation techniques Do relaxation exercises 10 to 20 minutes a day. You can play soothing, relaxing music while you do them, if you wish. · Tell others in your house that you are going to do your relaxation exercises. Ask them not to disturb you. · Find a comfortable place, away from all distractions and noise. · Lie down on your back, or sit with your back straight. · Focus on your breathing. Make it slow and steady. · Breathe in through your nose. Breathe out through either your nose or mouth. · Breathe deeply, filling up the area between your navel and your rib cage. Breathe so that your belly goes up and down. · Do not hold your breath. · Breathe like this for 5 to 10 minutes. Notice the feeling of calmness throughout your whole body. As you continue to breathe slowly and deeply, relax by doing the following for another 5 to 10 minutes: · Tighten and relax each muscle group in your body. You can begin at your toes and work your way up to your head. · Imagine your muscle groups relaxing and becoming heavy. · Empty your mind of all thoughts. · Let yourself relax more and more deeply. · Become aware of the state of calmness that surrounds you. · When your relaxation time is over, you can bring yourself back to alertness by moving your fingers and toes and then your hands and feet and then stretching and moving your entire body. Sometimes people fall asleep during relaxation, but they usually wake up shortly afterward. · Always give yourself time to return to full alertness before you drive a car or do anything that might cause an accident if you are not fully alert. Never play a relaxation tape while you drive a car. When should you call for help? Call 911 anytime you think you may need emergency care. For example, call if: 
· You feel you cannot stop from hurting yourself or someone else. Keep the numbers for these national suicide hotlines: 9-928-549-TALK (1-220.359.3093) and 3-671-GFWKLUE (8-502.671.8192). If you or someone you know talks about suicide or feeling hopeless, get help right away. Watch closely for changes in your health, and be sure to contact your doctor if: 
· You have anxiety or fear that affects your life. · You have symptoms of anxiety that are new or different from those you had before. Where can you learn more? Go to http://roro-kenyatta.info/. Enter P754 in the search box to learn more about \"Anxiety Disorder: Care Instructions. \" Current as of: July 26, 2016 Content Version: 11.2 © 5149-1879 [employer]. Care instructions adapted under license by [employer] (which disclaims liability or warranty for this information). If you have questions about a medical condition or this instruction, always ask your healthcare professional. [place] [address] any warranty or liability for your use of this information. Introducing [facility]! Dear Corina Barr: 
Thank you for requesting a Squabbler account. Our records indicate that you already have an active Squabbler account. You can access your account anytime at [email or web address] Did you know that you can access your hospital and ER discharge instructions at any time in Ringly? You can also review all of your test results from your hospital stay or ER visit. Additional Information If you have questions, please visit the Frequently Asked Questions section of the Ringly website at [email or web address] Remember, Ringly is NOT to be used for urgent needs. For medical emergencies, dial 911. Now available from your iPhone and Android! Please provide this summary of care documentation to your next provider. Your primary care clinician is listed as Avinash Small. If you have any questions after today's visit, please call [phone].

## 2017-05-09 NOTE — PROGRESS NOTES
Iris Carrasquillo is a 38 y.o. female presents to office for weight loss.       1. Have you been to the ER, urgent care clinic or hospitalized since your last visit? no          Health Maintenance items with a due date reviewed with patient:  Health Maintenance Due   Topic Date Due    DTaP/Tdap/Td series (1 - Tdap) 04/30/1994    PAP AKA CERVICAL CYTOLOGY  04/30/1994

## 2017-05-10 ENCOUNTER — TELEPHONE (OUTPATIENT)
Dept: FAMILY MEDICINE CLINIC | Age: 42
End: 2017-05-10

## 2017-05-10 NOTE — TELEPHONE ENCOUNTER
----- Message from Josh Foreman MD sent at 5/7/2017  2:04 PM EDT -----  Pls report normal to near normal labs to patient except trig which prob only  Needs some diet and exercise to bring back to normal. F/U FLP in 3-6 months.

## 2017-05-12 ENCOUNTER — OFFICE VISIT (OUTPATIENT)
Dept: FAMILY MEDICINE CLINIC | Age: 42
End: 2017-05-12

## 2017-05-12 VITALS
SYSTOLIC BLOOD PRESSURE: 101 MMHG | OXYGEN SATURATION: 97 % | HEART RATE: 79 BPM | WEIGHT: 225.4 LBS | DIASTOLIC BLOOD PRESSURE: 64 MMHG | BODY MASS INDEX: 41.48 KG/M2 | RESPIRATION RATE: 16 BRPM | TEMPERATURE: 97.6 F | HEIGHT: 62 IN

## 2017-05-12 DIAGNOSIS — Z51.89 ENCOUNTER FOR PATIENT COMPLIANCE MONITORING IN DRUG TREATMENT PROGRAM: ICD-10-CM

## 2017-05-12 RX ORDER — ALPRAZOLAM 1 MG/1
1 TABLET ORAL AS NEEDED
Qty: 30 TAB | Refills: 2 | Status: SHIPPED | OUTPATIENT
Start: 2017-05-12 | End: 2022-10-10

## 2017-05-12 NOTE — PROGRESS NOTES
Lakshmi Chew is a 38 y.o. female presents to office for medication refill. 1. Have you been to the ER, urgent care clinic or hospitalized since your last visit? no  2. Have you seen any other providers outside of [facility] since your last visit? yes  3.  Have you had a Flu shot this year? no      Health Maintenance items with a due date reviewed with patient:  Health Maintenance Due   Topic Date Due    DTaP/Tdap/Td series (1 - Tdap) 04/30/1994    PAP AKA CERVICAL CYTOLOGY  04/30/1994

## 2017-05-12 NOTE — PATIENT INSTRUCTIONS

## 2017-05-14 NOTE — PROGRESS NOTES
Marcus Rosas is a 38 y.o. female and presents with anxiety med refill. Chief Complaint   Patient presents with    Medication Refill     Subjective: Additional Concerns: none     Patient Active Problem List    Diagnosis Date Noted    Morbid obesity with BMI of 40.0-44.9, adult ([facility] 2017    Anxiety 2017    Urinary incontinence without sensory awareness     Solitary kidney, acquired     Recurrent UTI     Psychiatric disorder     Microhematuria     Stress incontinence      Current Outpatient Prescriptions   Medication Sig Dispense Refill    ALPRAZolam (XANAX) 1 mg tablet Take 1 Tab by mouth as needed. Max Daily Amount: 1 mg. 30 Tab 2    ibuprofen (MOTRIN) 600 mg tablet 600 mg.      albuterol (PROVENTIL HFA, VENTOLIN HFA, PROAIR HFA) 90 mcg/actuation inhaler 1-2 Puffs. Allergies   Allergen Reactions    Morphine Other (comments)     GI DISTRESS       Sulfa (Sulfonamide Antibiotics) Other (comments)     GI DISTRESS     Sulfamethoxazole-Trimethoprim Unknown (comments)    Vicodin [Hydrocodone-Acetaminophen] Other (comments)     Psychological Reaction        Past Medical History:   Diagnosis Date    Microhematuria     Psychiatric disorder     depression/  anxiety    Recurrent UTI     Solitary kidney, acquired     Stress incontinence     Urinary incontinence     Urinary incontinence without sensory awareness      Past Surgical History:   Procedure Laterality Date    HX  SECTION      HX HYSTERECTOMY  2012    partial hysterectomy 2/2 painful menses    HX NEPHRECTOMY Left 2000    pt. has right kidney    .     HX OTHER SURGICAL      Cholecystectomy     Family History   Problem Relation Age of Onset    Elevated Lipids Mother     Hypertension Mother     No Known Problems Father      Social History   Substance Use Topics    Smoking status: Never Smoker    Smokeless tobacco: Never Used    Alcohol use 0.6 oz/week     1 Shots of liquor per week      Comment: rarely drink   Q 3 mos. ROS     General: negative for - chills, fatigue, fever, weight change  Psych: positive for - anxiety,no depression, irritability or mood swings  Resp: negative for - cough, shortness of breath or wheezing  CV: negative for - chest pain, edema or palpitations  Neuro: negative for - confusion, headaches, seizures or weakness    Objective:  Vitals:    05/12/17 0929   BP: 101/64   Pulse: 79   Resp: 16   Temp: 97.6 °F (36.4 °C)   TempSrc: Oral   SpO2: 97%   Weight: 225 lb 6.4 oz (102.2 kg)   Height: 5' 2\" (1.575 m)   PainSc:   0 - No pain     PE    alert, well appearing, and in no distress, oriented to person, place, and time and overweight  Mental status - alert, oriented to person, place, and time, normal mood, behavior, speech, dress, motor activity, and thought processes  Chest - clear to auscultation, no wheezes, rales or rhonchi, symmetric air entry  Heart - normal rate, regular rhythm, normal S1, S2, no murmurs, rubs, clicks or gallops  Extremities - peripheral pulses normal, no pedal edema, no clubbing or cyanosis    [address] [facility] Outpatient Visit on 05/05/2017   Component Date Value Ref Range Status    LIPID PROFILE 05/05/2017        Final    Cholesterol, total 05/05/2017 172  <200 MG/DL Final    Triglyceride 05/05/2017 198* <150 MG/DL Final    Comment: The drugs N-acetylcysteine (NAC) and  Metamiszole have been found to cause falsely  low results in this chemical assay. Please  be sure to submit blood samples obtained  BEFORE administration of either of these  drugs to assure correct results.       HDL Cholesterol 05/05/2017 43  40 - 60 MG/DL Final    LDL, calculated 05/05/2017 89.4  0 - 100 MG/DL Final    VLDL, calculated 05/05/2017 39.6  MG/DL Final    CHOL/HDL Ratio 05/05/2017 4.0  0 - 5.0   Final    WBC 05/05/2017 13.0  4.6 - 13.2 K/uL Final    RBC 05/05/2017 5.41* 4.20 - 5.30 M/uL Final    HGB 05/05/2017 16.1* 12.0 - 16.0 g/dL Final    HCT 05/05/2017 48.4* 35.0 - 45.0 % Final    MCV 05/05/2017 89.5  74.0 - 97.0 FL Final    MCH 05/05/2017 29.8  24.0 - 34.0 PG Final    MCHC 05/05/2017 33.3  31.0 - 37.0 g/dL Final    RDW 05/05/2017 13.4  11.6 - 14.5 % Final    PLATELET 69/92/9882 660  135 - 420 K/uL Final    MPV 05/05/2017 10.0  9.2 - 11.8 FL Final    NEUTROPHILS 05/05/2017 73  40 - 73 % Final    LYMPHOCYTES 05/05/2017 22  21 - 52 % Final    MONOCYTES 05/05/2017 3  3 - 10 % Final    EOSINOPHILS 05/05/2017 2  0 - 5 % Final    BASOPHILS 05/05/2017 0  0 - 2 % Final    ABS. NEUTROPHILS 05/05/2017 9.4* 1.8 - 8.0 K/UL Final    ABS. LYMPHOCYTES 05/05/2017 2.9  0.9 - 3.6 K/UL Final    ABS. MONOCYTES 05/05/2017 0.4  0.05 - 1.2 K/UL Final    ABS. EOSINOPHILS 05/05/2017 0.2  0.0 - 0.4 K/UL Final    ABS. BASOPHILS 05/05/2017 0.0  0.0 - 0.06 K/UL Final    DF 05/05/2017 AUTOMATED    Final    TSH 05/05/2017 1.75  0.36 - 3.74 uIU/mL Final    Hemoglobin A1c 05/05/2017 5.6  4.2 - 5.6 % Final    Comment: (NOTE)  HbA1C Interpretive Ranges  <5.7              Normal  5.7 - 6.4         Consider Prediabetes  >6.5              Consider Diabetes      Est. average glucose 05/05/2017 114  mg/dL Final    Comment: (NOTE)  The eAG should be interpreted with patient characteristics in mind   since ethnicity, interindividual differences, red cell lifespan,   variation in rates of glycation, etc. may affect the validity of the   calculation.      Office Visit on 04/28/2017   Component Date Value Ref Range Status    Color (UA POC) 04/28/2017 Yellow   Final    Clarity (UA POC) 04/28/2017 Clear   Final    Glucose (UA POC) 04/28/2017 Negative  Negative Final    Bilirubin (UA POC) 04/28/2017 Negative  Negative Final    Ketones (UA POC) 04/28/2017 Negative  Negative Final    Specific gravity (UA POC) 04/28/2017 1.020  1.001 - 1.035 Final    Blood (UA POC) 04/28/2017 3+  Negative Final    Moderate    pH (UA POC) 04/28/2017 5.5  4.6 - 8.0 Final    Protein (UA POC) 04/28/2017 Negative Negative mg/dL Final    Urobilinogen (UA POC) 04/28/2017 0.2 mg/dL  0.2 - 1 Final    Nitrites (UA POC) 04/28/2017 Negative  Negative Final    Leukocyte esterase (UA POC) 04/28/2017 Negative  Negative Final    PVR 04/28/2017 127  f cc Final   Hospital Outpatient Visit on 02/15/2017   Component Date Value Ref Range Status    Sodium 02/15/2017 140  136 - 145 mmol/L Final    Potassium 02/15/2017 3.9  3.5 - 5.5 mmol/L Final    Chloride 02/15/2017 105  100 - 108 mmol/L Final    CO2 02/15/2017 28  21 - 32 mmol/L Final    Anion gap 02/15/2017 7  3.0 - 18 mmol/L Final    Glucose 02/15/2017 83  74 - 99 mg/dL Final    BUN 02/15/2017 15  7.0 - 18 MG/DL Final    Creatinine 02/15/2017 0.89  0.6 - 1.3 MG/DL Final    BUN/Creatinine ratio 02/15/2017 17  12 - 20   Final    GFR est AA 02/15/2017 >60  >60 ml/min/1.73m2 Final    GFR est non-AA 02/15/2017 >60  >60 ml/min/1.73m2 Final    Comment: (NOTE)  Estimated GFR is calculated using the Modification of Diet in Renal   Disease (MDRD) Study equation, reported for both  Americans   (GFRAA) and non- Americans (GFRNA), and normalized to 1.73m2   body surface area. The physician must decide which value applies to   the patient. The MDRD study equation should only be used in   individuals age 25 or older. It has not been validated for the   following: pregnant women, patients with serious comorbid conditions,   or on certain medications, or persons with extremes of body size,   muscle mass, or nutritional status.  Calcium 02/15/2017 8.5  8.5 - 10.1 MG/DL Final   Office Visit on 02/15/2017   Component Date Value Ref Range Status    Amphetamines, urine 02/15/2017 Negative  Gcmgoj=5041 ng/mL Final    Amphetamine test includes Amphetamine and Methamphetamine.     Barbiturates 02/15/2017 Negative  Tprlzl=111 ng/mL Final    Benzodiazepines 02/15/2017 Negative  Whgnzm=914 ng/mL Final    Cannabinoids 02/15/2017 Negative  Cutoff=50 ng/mL Final    Cocaine 02/15/2017 Negative  Aszhtw=805 ng/mL Final    Opiates 02/15/2017 Negative  Qbmeif=281 ng/mL Final    Opiate test includes Codeine and Morphine only.  Phencyclidine 02/15/2017 Negative  Cutoff=25 ng/mL Final    Drug Screen Comment: 02/15/2017 Comment   Final    Comment: This assay provides a preliminary unconfirmed analytical test result  that may be suitable for the clinical management of patients in  certain situations. For workplace drug testing programs, preliminary  positive findings should always be confirmed by an alternative method. Some over-the-counter medications, as well as adulterants, may cause  inaccurate results. Screen Only testing does not meet the College of  American [employer]ologists Forensic Urine Drug Testing Program  requirements as a forensic urine drug test for workplace testing. All  clients must ensure that their testing program conforms to applicable  state and federal laws and employment agreements.      Office Visit on 01/19/2017   Component Date Value Ref Range Status    PVR 01/19/2017 0  cc Final    Color (UA POC) 01/19/2017 Yellow   Final    Clarity (UA POC) 01/19/2017 Clear   Final    Glucose (UA POC) 01/19/2017 Negative  Negative Final    Bilirubin (UA POC) 01/19/2017 Negative  Negative Final    Ketones (UA POC) 01/19/2017 Negative  Negative Final    Specific gravity (UA POC) 01/19/2017 1.020  1.001 - 1.035 Final    Blood (UA POC) 01/19/2017 2+  Negative Final    pH (UA POC) 01/19/2017 5.5  4.6 - 8.0 Final    Protein (UA POC) 01/19/2017 Negative  Negative mg/dL Final    Urobilinogen (UA POC) 01/19/2017 0.2 mg/dL  0.2 - 1 Final    Nitrites (UA POC) 01/19/2017 Negative  Negative Final    Leukocyte esterase (UA POC) 01/19/2017 Negative  Negative Final       TESTS  Results for orders placed or performed during the hospital encounter of 05/05/17   LIPID PANEL   Result Value Ref Range    LIPID PROFILE          Cholesterol, total 172 <200 MG/DL    Triglyceride 198 (H) <150 MG/DL    HDL Cholesterol 43 40 - 60 MG/DL    LDL, calculated 89.4 0 - 100 MG/DL    VLDL, calculated 39.6 MG/DL    CHOL/HDL Ratio 4.0 0 - 5.0     CBC WITH AUTOMATED DIFF   Result Value Ref Range    WBC 13.0 4.6 - 13.2 K/uL    RBC 5.41 (H) 4.20 - 5.30 M/uL    HGB 16.1 (H) 12.0 - 16.0 g/dL    HCT 48.4 (H) 35.0 - 45.0 %    MCV 89.5 74.0 - 97.0 FL    MCH 29.8 24.0 - 34.0 PG    MCHC 33.3 31.0 - 37.0 g/dL    RDW 13.4 11.6 - 14.5 %    PLATELET 809 434 - 340 K/uL    MPV 10.0 9.2 - 11.8 FL    NEUTROPHILS 73 40 - 73 %    LYMPHOCYTES 22 21 - 52 %    MONOCYTES 3 3 - 10 %    EOSINOPHILS 2 0 - 5 %    BASOPHILS 0 0 - 2 %    ABS. NEUTROPHILS 9.4 (H) 1.8 - 8.0 K/UL    ABS. LYMPHOCYTES 2.9 0.9 - 3.6 K/UL    ABS. MONOCYTES 0.4 0.05 - 1.2 K/UL    ABS. EOSINOPHILS 0.2 0.0 - 0.4 K/UL    ABS. BASOPHILS 0.0 0.0 - 0.06 K/UL    DF AUTOMATED     TSH 3RD GENERATION   Result Value Ref Range    TSH 1.75 0.36 - 3.74 uIU/mL   HEMOGLOBIN A1C WITH EAG   Result Value Ref Range    Hemoglobin A1c 5.6 4.2 - 5.6 %    Est. average glucose 114 mg/dL     Assessment/Plan:       Encounter for patient compliance monitoring in drug treatment program  - ALPRAZolam (XANAX) 1 mg tablet; Take 1 Tab by mouth as needed. Max Daily Amount: 1 mg. Dispense: 30 Tab; Refill: 2    Lab review: no lab studies available for review at time of visit    I have discussed the diagnosis with the patient and the intended plan as seen in the above orders. The patient has received an after-visit summary and questions were answered concerning future plans. I have discussed medication side effects and warnings with the patient as well. I have reviewed the plan of care with the patient, accepted their input and they are in agreement with the treatment goals. Follow-up Disposition:  Return in about 3 months (around 8/12/2017), or if symptoms worsen or fail to improve.     Haris Estrada MD

## 2017-06-01 ENCOUNTER — OFFICE VISIT (OUTPATIENT)
Dept: FAMILY MEDICINE CLINIC | Age: 42
End: 2017-06-01

## 2017-06-01 DIAGNOSIS — E66.9 OBESITY, UNSPECIFIED OBESITY SEVERITY, UNSPECIFIED OBESITY TYPE: Primary | ICD-10-CM

## 2017-06-01 NOTE — PROGRESS NOTES
Patient attended a Medically Supervised Weight Loss New Patient Orientation today where we discussed:  - New Direction Very Low Calorie Diet details  - Medical Supervision  - Nutrition education  - Cost  - Policies and compliance required for program enrollment. - Lab slip was given to patient with instructions for having them drawn. Patients initial consultation with physician is tentatively scheduled for:  Future Appointments  Date Time Provider Rosamaria Cami   6/16/2017 11:00 AM Dre Segovia  [facility]   8/11/2017 9:30 AM Laverne Del Castillo  [facility]   10/27/2017 10:00 AM Goyo Hensley MD [address] [facility] starting weight was documented as: There were no vitals taken for this visit. The following patient answers were pulled from Weight Loss Questionnaire regarding weight related illness: (refer to media section for full weight loss history)  Hypertension (high blood pressure)  no   High cholesterol no   Hypothyroidism no   Obstructive sleep apnea no   Gout no   Arthritis no ;    Heart Attack in the last 3 months: no     Type I Diabetes no   Type II Diabetes   no           [facility]  Metabolic   [address] [place] [address]  Medically Supervised [facility] Loss Program  [address] [facility] [address] [facility] [address], [address]  [phone]  office  [phone]  Fax  [email or web address]  [email or web address]

## 2017-06-09 ENCOUNTER — OFFICE VISIT (OUTPATIENT)
Dept: FAMILY MEDICINE CLINIC | Age: 42
End: 2017-06-09

## 2017-06-09 VITALS
BODY MASS INDEX: 41.7 KG/M2 | TEMPERATURE: 98.6 F | DIASTOLIC BLOOD PRESSURE: 68 MMHG | SYSTOLIC BLOOD PRESSURE: 105 MMHG | HEART RATE: 77 BPM | RESPIRATION RATE: 16 BRPM | HEIGHT: 62 IN | WEIGHT: 226.6 LBS | OXYGEN SATURATION: 97 %

## 2017-06-09 DIAGNOSIS — H60.332 ACUTE SWIMMER'S EAR OF LEFT SIDE: Primary | ICD-10-CM

## 2017-06-09 NOTE — PROGRESS NOTES
Slime Ames is a 38 y.o. female presents to office for ear pain x 3 days. 1. Have you been to the ER, urgent care clinic or hospitalized since your last visit? no  2. Have you seen any other providers outside of [facility] since your last visit? no  3.  Have you had a Flu shot this year? no      Health Maintenance items with a due date reviewed with patient:  Health Maintenance Due   Topic Date Due    DTaP/Tdap/Td series (1 - Tdap) 04/30/1994    PAP AKA CERVICAL CYTOLOGY  04/30/1994

## 2017-06-09 NOTE — PROGRESS NOTES
Ronnie Worley is a 38 y.o.  female and presents with 3 days of ear pain with no other URI symptoms. Patient has been swimming lately. No recurrent ear problems in the past.     Chief Complaint   Patient presents with    Ear Pain     Subjective: Additional Concerns: none    Patient Active Problem List    Diagnosis Date Noted    Morbid obesity with BMI of 40.0-44.9, adult ([facility] [address] 2017    Anxiety 2017    Urinary incontinence without sensory awareness     Solitary kidney, acquired     Recurrent UTI     Psychiatric disorder     Microhematuria     Stress incontinence      Current Outpatient Prescriptions   Medication Sig Dispense Refill    ALPRAZolam (XANAX) 1 mg tablet Take 1 Tab by mouth as needed. Max Daily Amount: 1 mg. 30 Tab 2    ibuprofen (MOTRIN) 600 mg tablet 600 mg.      albuterol (PROVENTIL HFA, VENTOLIN HFA, PROAIR HFA) 90 mcg/actuation inhaler 1-2 Puffs. Allergies   Allergen Reactions    Morphine Other (comments)     GI DISTRESS       Sulfa (Sulfonamide Antibiotics) Other (comments)     GI DISTRESS     Sulfamethoxazole-Trimethoprim Unknown (comments)    Vicodin [Hydrocodone-Acetaminophen] Other (comments)     Psychological Reaction        Past Medical History:   Diagnosis Date    Microhematuria     Psychiatric disorder     depression/  anxiety    Recurrent UTI     Solitary kidney, acquired     Stress incontinence     Urinary incontinence     Urinary incontinence without sensory awareness      Past Surgical History:   Procedure Laterality Date    HX  SECTION      HX HYSTERECTOMY      partial hysterectomy 2/2 painful menses    HX NEPHRECTOMY Left 2000    pt. has right kidney    .     HX OTHER SURGICAL      Cholecystectomy     Family History   Problem Relation Age of Onset    Elevated Lipids Mother     Hypertension Mother     No Known Problems Father      Social History   Substance Use Topics    Smoking status: Never Smoker    Smokeless tobacco: Never Used    Alcohol use 0.6 oz/week     1 Shots of liquor per week      Comment:  rarely drink   Q 3 mos. ROS     General: negative for - chills, fatigue, fever, weight change  ENT: negative for - headaches, hearing change, nasal congestion, oral lesions, sneezing or sore throat, positive for left ear pain    Objective:  Vitals:    06/09/17 0915   BP: 105/68   Pulse: 77   Resp: 16   Temp: 98.6 °F (37 °C)   TempSrc: Oral   SpO2: 97%   Weight: 226 lb 9.6 oz (102.8 kg)   Height: 5' 2\" (1.575 m)   PainSc:   5   PainLoc: Ear     PE    Alert, well appearing, and in no distress, oriented to person, place, and time and overweight  Mental status - alert, oriented to person, place, and time, normal mood, behavior, speech, dress, motor activity, and thought processes  Ears - right ear normal, left external canal mildly inflamed, hearing grossly normal bilaterally, left TM slight streak of erythema, no bulge, no exudate or any discharge. Left TM is intact otherwise. [facility] Outpatient Visit on 05/05/2017   Component Date Value Ref Range Status    LIPID PROFILE 05/05/2017        Final    Cholesterol, total 05/05/2017 172  <200 MG/DL Final    Triglyceride 05/05/2017 198* <150 MG/DL Final    Comment: The drugs N-acetylcysteine (NAC) and  Metamiszole have been found to cause falsely  low results in this chemical assay. Please  be sure to submit blood samples obtained  BEFORE administration of either of these  drugs to assure correct results.       HDL Cholesterol 05/05/2017 43  40 - 60 MG/DL Final    LDL, calculated 05/05/2017 89.4  0 - 100 MG/DL Final    VLDL, calculated 05/05/2017 39.6  MG/DL Final    CHOL/HDL Ratio 05/05/2017 4.0  0 - 5.0   Final    WBC 05/05/2017 13.0  4.6 - 13.2 K/uL Final    RBC 05/05/2017 5.41* 4.20 - 5.30 M/uL Final    HGB 05/05/2017 16.1* 12.0 - 16.0 g/dL Final    HCT 05/05/2017 48.4* 35.0 - 45.0 % Final    MCV 05/05/2017 89.5  74.0 - 97.0 FL Final    MCH 05/05/2017 29.8  24.0 - 34.0 PG Final    MCHC 05/05/2017 33.3  31.0 - 37.0 g/dL Final    RDW 05/05/2017 13.4  11.6 - 14.5 % Final    PLATELET 93/62/9766 816  135 - 420 K/uL Final    MPV 05/05/2017 10.0  9.2 - 11.8 FL Final    NEUTROPHILS 05/05/2017 73  40 - 73 % Final    LYMPHOCYTES 05/05/2017 22  21 - 52 % Final    MONOCYTES 05/05/2017 3  3 - 10 % Final    EOSINOPHILS 05/05/2017 2  0 - 5 % Final    BASOPHILS 05/05/2017 0  0 - 2 % Final    ABS. NEUTROPHILS 05/05/2017 9.4* 1.8 - 8.0 K/UL Final    ABS. LYMPHOCYTES 05/05/2017 2.9  0.9 - 3.6 K/UL Final    ABS. MONOCYTES 05/05/2017 0.4  0.05 - 1.2 K/UL Final    ABS. EOSINOPHILS 05/05/2017 0.2  0.0 - 0.4 K/UL Final    ABS. BASOPHILS 05/05/2017 0.0  0.0 - 0.06 K/UL Final    DF 05/05/2017 AUTOMATED    Final    TSH 05/05/2017 1.75  0.36 - 3.74 uIU/mL Final    Hemoglobin A1c 05/05/2017 5.6  4.2 - 5.6 % Final    Comment: (NOTE)  HbA1C Interpretive Ranges  <5.7              Normal  5.7 - 6.4         Consider Prediabetes  >6.5              Consider Diabetes      Est. average glucose 05/05/2017 114  mg/dL Final    Comment: (NOTE)  The eAG should be interpreted with patient characteristics in mind   since ethnicity, interindividual differences, red cell lifespan,   variation in rates of glycation, etc. may affect the validity of the   calculation.      Office Visit on 04/28/2017   Component Date Value Ref Range Status    Color (UA POC) 04/28/2017 Yellow   Final    Clarity (UA POC) 04/28/2017 Clear   Final    Glucose (UA POC) 04/28/2017 Negative  Negative Final    Bilirubin (UA POC) 04/28/2017 Negative  Negative Final    Ketones (UA POC) 04/28/2017 Negative  Negative Final    Specific gravity (UA POC) 04/28/2017 1.020  1.001 - 1.035 Final    Blood (UA POC) 04/28/2017 3+  Negative Final    Moderate    pH (UA POC) 04/28/2017 5.5  4.6 - 8.0 Final    Protein (UA POC) 04/28/2017 Negative  Negative mg/dL Final    Urobilinogen (UA POC) 04/28/2017 0.2 mg/dL  0.2 - 1 Final    Nitrites (UA POC) 04/28/2017 Negative  Negative Final    Leukocyte esterase (UA POC) 04/28/2017 Negative  Negative Final    PVR 04/28/2017 127  f cc Final   Hospital Outpatient Visit on 02/15/2017   Component Date Value Ref Range Status    Sodium 02/15/2017 140  136 - 145 mmol/L Final    Potassium 02/15/2017 3.9  3.5 - 5.5 mmol/L Final    Chloride 02/15/2017 105  100 - 108 mmol/L Final    CO2 02/15/2017 28  21 - 32 mmol/L Final    Anion gap 02/15/2017 7  3.0 - 18 mmol/L Final    Glucose 02/15/2017 83  74 - 99 mg/dL Final    BUN 02/15/2017 15  7.0 - 18 MG/DL Final    Creatinine 02/15/2017 0.89  0.6 - 1.3 MG/DL Final    BUN/Creatinine ratio 02/15/2017 17  12 - 20   Final    GFR est AA 02/15/2017 >60  >60 ml/min/1.73m2 Final    GFR est non-AA 02/15/2017 >60  >60 ml/min/1.73m2 Final    Comment: (NOTE)  Estimated GFR is calculated using the Modification of Diet in Renal   Disease (MDRD) Study equation, reported for both  Americans   (GFRAA) and non- Americans (GFRNA), and normalized to 1.73m2   body surface area. The physician must decide which value applies to   the patient. The MDRD study equation should only be used in   individuals age 25 or older. It has not been validated for the   following: pregnant women, patients with serious comorbid conditions,   or on certain medications, or persons with extremes of body size,   muscle mass, or nutritional status.  Calcium 02/15/2017 8.5  8.5 - 10.1 MG/DL Final   Office Visit on 02/15/2017   Component Date Value Ref Range Status    Amphetamines, urine 02/15/2017 Negative  Mrladl=5768 ng/mL Final    Amphetamine test includes Amphetamine and Methamphetamine.     Barbiturates 02/15/2017 Negative  Kzcpbk=341 ng/mL Final    Benzodiazepines 02/15/2017 Negative  Npdmzc=329 ng/mL Final    Cannabinoids 02/15/2017 Negative  Cutoff=50 ng/mL Final    Cocaine 02/15/2017 Negative  Pnifrh=783 ng/mL Final    Opiates 02/15/2017 Negative Eazazi=055 ng/mL Final    Opiate test includes Codeine and Morphine only.  Phencyclidine 02/15/2017 Negative  Cutoff=25 ng/mL Final    Drug Screen Comment: 02/15/2017 Comment   Final    Comment: This assay provides a preliminary unconfirmed analytical test result  that may be suitable for the clinical management of patients in  certain situations. For workplace drug testing programs, preliminary  positive findings should always be confirmed by an alternative method. Some over-the-counter medications, as well as adulterants, may cause  inaccurate results. Screen Only testing does not meet the [employer] of  [employer] Forensic Urine Drug Testing Program  requirements as a forensic urine drug test for workplace testing. All  clients must ensure that their testing program conforms to applicable  state and federal laws and employment agreements.      Office Visit on 01/19/2017   Component Date Value Ref Range Status    PVR 01/19/2017 0  cc Final    Color (UA POC) 01/19/2017 Yellow   Final    Clarity (UA POC) 01/19/2017 Clear   Final    Glucose (UA POC) 01/19/2017 Negative  Negative Final    Bilirubin (UA POC) 01/19/2017 Negative  Negative Final    Ketones (UA POC) 01/19/2017 Negative  Negative Final    Specific gravity (UA POC) 01/19/2017 1.020  1.001 - 1.035 Final    Blood (UA POC) 01/19/2017 2+  Negative Final    pH (UA POC) 01/19/2017 5.5  4.6 - 8.0 Final    Protein (UA POC) 01/19/2017 Negative  Negative mg/dL Final    Urobilinogen (UA POC) 01/19/2017 0.2 mg/dL  0.2 - 1 Final    Nitrites (UA POC) 01/19/2017 Negative  Negative Final    Leukocyte esterase (UA POC) 01/19/2017 Negative  Negative Final       TESTS  Results for orders placed or performed during the hospital encounter of 05/05/17   LIPID PANEL   Result Value Ref Range    LIPID PROFILE          Cholesterol, total 172 <200 MG/DL    Triglyceride 198 (H) <150 MG/DL    HDL Cholesterol 43 40 - 60 MG/DL    LDL, calculated 89.4 0 - 100 MG/DL    VLDL, calculated 39.6 MG/DL    CHOL/HDL Ratio 4.0 0 - 5.0     CBC WITH AUTOMATED DIFF   Result Value Ref Range    WBC 13.0 4.6 - 13.2 K/uL    RBC 5.41 (H) 4.20 - 5.30 M/uL    HGB 16.1 (H) 12.0 - 16.0 g/dL    HCT 48.4 (H) 35.0 - 45.0 %    MCV 89.5 74.0 - 97.0 FL    MCH 29.8 24.0 - 34.0 PG    MCHC 33.3 31.0 - 37.0 g/dL    RDW 13.4 11.6 - 14.5 %    PLATELET 270 017 - 508 K/uL    MPV 10.0 9.2 - 11.8 FL    NEUTROPHILS 73 40 - 73 %    LYMPHOCYTES 22 21 - 52 %    MONOCYTES 3 3 - 10 %    EOSINOPHILS 2 0 - 5 %    BASOPHILS 0 0 - 2 %    ABS. NEUTROPHILS 9.4 (H) 1.8 - 8.0 K/UL    ABS. LYMPHOCYTES 2.9 0.9 - 3.6 K/UL    ABS. MONOCYTES 0.4 0.05 - 1.2 K/UL    ABS. EOSINOPHILS 0.2 0.0 - 0.4 K/UL    ABS. BASOPHILS 0.0 0.0 - 0.06 K/UL    DF AUTOMATED     TSH 3RD GENERATION   Result Value Ref Range    TSH 1.75 0.36 - 3.74 uIU/mL   HEMOGLOBIN A1C WITH EAG   Result Value Ref Range    Hemoglobin A1c 5.6 4.2 - 5.6 %    Est. average glucose 114 mg/dL     Assessment/Plan:     Acute early otitis externa left ear - Empiric treatment with Ciprodex otic x 7 days. No swimming until completely well. Lab review: orders written for new lab studies as appropriate; see orders    I have discussed the diagnosis with the patient and the intended plan as seen in the above orders. The patient has received an after-visit summary and questions were answered concerning future plans. I have discussed medication side effects and warnings with the patient as well. I have reviewed the plan of care with the patient, accepted their input and they are in agreement with the treatment goals. F/U as needed in one week if not better.        Isela Alcantar MD

## 2017-06-09 NOTE — PATIENT INSTRUCTIONS

## 2017-06-16 ENCOUNTER — OFFICE VISIT (OUTPATIENT)
Dept: FAMILY MEDICINE CLINIC | Age: 42
End: 2017-06-16

## 2017-06-16 VITALS
BODY MASS INDEX: 41.88 KG/M2 | RESPIRATION RATE: 20 BRPM | WEIGHT: 227.6 LBS | HEIGHT: 62 IN | OXYGEN SATURATION: 95 % | SYSTOLIC BLOOD PRESSURE: 104 MMHG | TEMPERATURE: 96.8 F | DIASTOLIC BLOOD PRESSURE: 62 MMHG | HEART RATE: 67 BPM

## 2017-06-16 DIAGNOSIS — E66.01 MORBID OBESITY WITH BMI OF 40.0-44.9, ADULT (HCC): Primary | ICD-10-CM

## 2017-06-16 NOTE — MR AVS SNAPSHOT
Visit Information Date & Time Provider Department Dept. Phone Encounter #  
 6/16/2017 11:00 AM Amaya Benito NP [address] [phone] [id number] Your Appointments 8/11/2017  9:30 AM  
Follow Up with Inga Whitmore MD  
Formerly [facility]) Appt Note: 3 mon f/u  
 [address] [facility] [address] [facility] [address]  
[phone]  
  
   
 [address] [place] [id number]  
  
    
 10/27/2017 10:00 AM  
ESTABLISHED PATIENT with Jer Kate MD  
[facility] [address] ([facility]) Appt Note: 6MO F/UP STRESS INCONT  
 [address] [place] [address]  
[phone]  
  
   
 [place] [id number] Upcoming Health Maintenance Date Due DTaP/Tdap/Td series (1 - Tdap) 4/30/1994 PAP AKA CERVICAL CYTOLOGY 4/30/1994 INFLUENZA AGE 9 TO ADULT 8/1/2017 Allergies as of 6/16/2017  Review Complete On: 6/16/2017 By: Amaya Benito NP Severity Noted Reaction Type Reactions Morphine  09/21/2016    Other (comments) GI DISTRESS Sulfa (Sulfonamide Antibiotics)  09/21/2016    Other (comments) GI DISTRESS Sulfamethoxazole-trimethoprim  01/01/2017    Unknown (comments) Vicodin [Hydrocodone-acetaminophen]  09/21/2016    Other (comments) Psychological Reaction Current Immunizations  Never Reviewed No immunizations on file. Not reviewed this visit You Were Diagnosed With   
  
 Codes Comments Morbid obesity with BMI of 40.0-44.9, adult ([facility]    -  Primary ICD-10-CM: E66.01, Z68.41 
ICD-9-CM: 278.01, V85.41 Vitals BP Pulse Temp Resp Height(growth percentile) Weight(growth percentile) 104/62 67 96.8 °F (36 °C) (Oral) 20 5' 2\" (1.575 m) 227 lb 9.6 oz (103.2 kg) SpO2 BMI OB Status Smoking Status 95% 41.63 kg/m2 Hysterectomy Never Smoker Vitals History BMI and BSA Data Body Mass Index Body Surface Area  
 41.63 kg/m 2 2.12 m 2 Preferred Pharmacy Pharmacy Name Phone [facility] [address] [facility] [phone] Your Updated Medication List  
  
   
This list is accurate as of: 6/16/17 11:34 AM.  Always use your most recent med list.  
  
  
  
  
 albuterol 90 mcg/actuation inhaler Commonly known as:  PROVENTIL HFA, VENTOLIN HFA, PROAIR HFA  
1-2 Puffs. ALPRAZolam 1 mg tablet Commonly known as:  Pama Ervin Take 1 Tab by mouth as needed. Max Daily Amount: 1 mg.  
  
 ciprofloxacin-hydrocortisone otic suspension Commonly known as:  CIPRO HC OTIC Administer 3 Drops in left ear two (2) times a day for 7 days. Indications: Otitis Externa  
  
 ibuprofen 600 mg tablet Commonly known as:  MOTRIN  
600 mg. We Performed the Following AMB POC EKG ROUTINE W/ 12 LEADS, INTER & REP [76660 CPT(R)] Patient Instructions 4 meal replacements a day. No other food. First one within about 1 hour of waking up to get metabolism started. Don't go more than 6 hours between meals. No more than 1 soup a day- too much salt No more than 1 bar a day- not enough protein.  
  
10 carbs extra a day. Compliance 
  
We do not expect perfection. However, we do ask for your persistence and your willingness to do the work of growing yourself.  
  
You will hit plateaus in your weight loss. You will run into situations that test your will power. [facility] will encounter times when you feel frustrated. It's OK if you slip up from time to time. However, how your respond to your slip ups and, the adjustments you make to prevent future slip ups will determine you long-term success. 
  
If you find yourself temporarily slipping in the program, it's OK.  We will gently nudge you forward and encourage you to do the work of identifying and moving beyond your stuck areas. However, if you find yourself wavering in the program for a prolonged time (more than 3 or 4 months) we will ask that you take a break from the program. At that time you will 3 options:  
  
1. Work with our weight loss specialist GUILLE Rodriguez to explore additional weight loss options.  
  
2. Work with a counselor to do some focused work in order to identify and break the patterns that are holding you back.  
  
3. The combination of both these. 
  
Once you, your counselor and/or Shweta feel that you have moved past those patterns and want to give the program another chance, we will gladly work with you to determine if you're ready to start back in the program. 
  
When returning after a break, if it's been less than 3 months, you do not need to repeat an orientation, labs or an EKG. If it's over been 3 months you will required to repeat an orientation and, if greater than 6 months, you will be required to repeat an orientation, labs and an EKG.   
  
  
Additional Tips 
  
- Consume your 4 daily meal replacements equally spaced over the day No more than 6 hours between each meal 
Breakfast is especially important 
  
- Get the support of family and friends 
  
- Snack-proof your house 
  
- Have strategies for social situations, meetings that run over or vacation 
  
  
- When you fall off the plan it's no big deal; just start right back. Turn those days into examples of what to change or avoid next time.  
  
  
 
Homework for [employer] 
 
Exercise Exercise daily  
- Start slow, gradually increase your time by around 10 to 20 % a week - To prevent injury, take a recovery week every 4 weeks (reduce your exercise time and intensity by 1/2 during this time) - Your goal is to work up to 150 min a week; hard enough that you can't whistle or sing. It may take 6 months to work up to this. - Call the Health  at [facility] labs for exercise ideas ([phone]) Diet Common Side Effects 
 
-Constipation 
-Fatigue 
-Hunger 
-Low sodium 
-Hair Loss 1. Constipation  
     -around 1 out of 5 chance it happens at all 
     -around 1 out of 10 chance you'll need to take something (see below) It can be prevented by Drinking at least 8 glasses of water a day If you're prone to constipation: - Take a Stool Softener every day:  (eg - Dulcolax Stool Softener 100 mg or Miralax) - Eat Plenty of Fiber Get the New Direction products with fiber added Keep your fiber intake to at least 20 grams a day Use SUGAR-FREE products: Fiber One products, Benefiber or Metamucil If you get constipated: 1. Start with a Stool Softener (produces a bowel movement in 72 hr): 
 Examples: 
  Miralax 1 capful twice a day (It's OK to go up to 3 caps for a few days) Dulcolax Stool Softener 100 mg 
 
2. If you still have constipation after 2 or 3 days, add a Stimulant Laxative to the Stool Softener (this will produce a bowel movement in 6 - 12 hr): 
 Examples: 
  Exlax (1 small square) for up to 4 days Dulcolax stimulant laxative Magnesium citrate pill 500 mg start with once a day and go up to 3 times a day if needed 3. Or you can go straight to a combination Stool Softner / Stimulant at night. If you need, you can add a morning dose. Examples: 
  Pericolace 50 mg / 8.25 mg Sennakot-S  50 mg / 8.25 mg 
 
4. If You're Really locked up, get Liquid Magnesium Citrate (take according to the      directions) 2. Fatigue 
     -Everyone goes through this stage More common in the first 2 weeks It's your body adjusting to the Ketogenic diet and it's normal  
 
 
3. Hunger More common in the first few days After your body adjusts to the Ketogenic diet it will go away 4. Low blood levels of sodium -Not very common, especially if you're not taking a fluid pil If you develop a headache, feel fatigued, light-headed or dizzy Add 1/2 cup of bouillon broth every day 5. Hair loss 
     -This is fairly uncommon; you may see more than a usual amount of hair in your brush or the shower drain This can happen with physical stress (surgery, trauma or calorie restriction) or psychological stress. Although is it very concerning, it is often temporary and will resolve within 3 months. Some people notice a benefit from taking a daily dose of Biotin 2,500 mcg and increasing their Fish Oil intake. Other Tips and Helpful Information - Get the following books (all found on [employer]) Change Anything by Shane Douglass, Tez Macias, and Ida Daniels Mindless Eating by Alissa Mckeon Perfectly Yourself by Trae Oh Me, Myself and I - 28 Days to Self-Love by Bina Quintero - Consume your 4 daily meal replacements equally spaced over the    day No more than 6 hours between each meal 
 Breakfast is especially important - Get the support of family and friends 
 
- Snack-proof your house - Have strategies for social situations, meetings that run over or vacation - When you fall off the plan it's no big deal; just start right back; turn those days into examples of what to avoid next time. Long-term Healthy Weight / Body Fat Different ways to determining your ideal weight: 
1. Keep your waist to less than 1/2 of your height 2. Keep your % body fat to under 30% for female and under 20% if male 3. Keep your BMI around 25 Supplements 1. Vitacost Synergy Basic Multi-Vitamin (Their In-House Brand) Take 1 capsule twice a day Found ONLY at [facility], NOT at [facility], [employer], the Vitamin Shoppe, etc 
    SKU #: [id number]  
    $16.49   / 1 month supply 
    www. [employer]  [phone]  
 
 
2.   N-Acetyl Cysteine (NAC) -- 600 mg 
 1 pill once a day Found at many stores but [address] has a good price: 
     Item #: [id number] [id number]  $9.99 / 120 pills (4 month supply) 3. Turmeric with Black Pepper Extract (or Bioperine) 500 mg 
    1 pill 1-2 times a day (more is joint pain or increased inflammation) Found at many stores but [address] has a good price: 
    SKU #: [id number]  $13.64 / 60 pills 4. Fish Oil Take 1 capsule a day [employer] Shoppe Brand: \"Omega 3 Fish Oil (per pill:  )\" OR 
 
Take 1 Tbsp 3 days a week of any of the following: 
 
Vitamin Shoppe Brand: Lemon or Orange flavored Fish Oil Nutra Sea + D:  Apple flavored Nutra Sea:  [place] flavored (These are found at most Vitamin Stores or on [employer]) FYI:  
Typical fish oil per pill =  mg and  mg 
Krill oil per pill = EPA 50 - 70 mg and DHA 27 - 250 mg 
 
 
^^^^^^^^^^^^^^^^^^^^^^^^^^^^^^^^^^^^^^^^^^^^^^^^^^^^^^^^^^^^^^^^^^^^^^^^^^^^^^^^^^^^^^^^^^^^^^^ Carbohydrates If you do not metabolize carbohydrates well, you will lose weight and keep the weigh off by keeping your carbohydrate intake low. How do you know if you do not metabolize carbs well? If your Triglycerides, sdLCL-C and Insulin Resistance are elevated, then you will do well to follow a low carb diet. Fun Facts About Carbs - The Typical American Diet = 450 grams a day - The Reducing Phase of the VLCD = 40 grams a day - Target for weight loss maintenance: 
 - Eat no more than 30 grams per meal 
 - Eat no more than 10 grams per snack (mid-morning and mid-afternoon snack) Resources for finding out where carbs hide in our foods: 
1. Our Registered Dietitians 2. Www. Atkins. com/tools 3. Read food labels 4. Books - The Calorie [place] - The New Atkins Diet for a New You 
     - Kaylyn Mosqueda's NEW Carb and Calorie 4th Edition (my favorite) 5. Smart phone and Internet-based apps - [employer] JVPal  
     - Carb Manager If you want to get a better picture of your cardiac risk, consider getting a coronary calcium score:  Call [phone] to schedule one. Introducing [facility]! Dear Ethan Burger: 
Thank you for requesting a Timeet account. Our records indicate that you already have an active Timeet account. You can access your account anytime at [email or web address] Did you know that you can access your hospital and ER discharge instructions at any time in Timeet? You can also review all of your test results from your hospital stay or ER visit. Additional Information If you have questions, please visit the Frequently Asked Questions section of the Timeet website at [email or web address] Remember, Timeet is NOT to be used for urgent needs. For medical emergencies, dial 911. Now available from your iPhone and Android! Please provide this summary of care documentation to your next provider. Your primary care clinician is listed as Avinash Small. If you have any questions after today's visit, please call [phone].

## 2017-06-16 NOTE — PATIENT INSTRUCTIONS
4 meal replacements a day. No other food. First one within about 1 hour of waking up to get metabolism started. Don't go more than 6 hours between meals. No more than 1 soup a day- too much salt  No more than 1 bar a day- not enough protein.      10 carbs extra a day. Compliance     We do not expect perfection. However, we do ask for your persistence and your willingness to do the work of growing yourself.      You will hit plateaus in your weight loss. You will run into situations that test your will power. Vitorus Molina will encounter times when you feel frustrated. It's OK if you slip up from time to time. However, how your respond to your slip ups and, the adjustments you make to prevent future slip ups will determine you long-term success.     If you find yourself temporarily slipping in the program, it's OK. We will gently nudge you forward and encourage you to do the work of identifying and moving beyond your stuck areas. However, if you find yourself wavering in the program for a prolonged time (more than 3 or 4 months) we will ask that you take a break from the program. At that time you will 3 options:      1. Work with our weight loss specialist [place], [place] to explore additional weight loss options.      2. Work with a counselor to do some focused work in order to identify and break the patterns that are holding you back.      3. The combination of both these.     Once you, your counselor and/or Shweta feel that you have moved past those patterns and want to give the program another chance, we will gladly work with you to determine if you're ready to start back in the program.     When returning after a break, if it's been less than 3 months, you do not need to repeat an orientation, labs or an EKG. If it's over been 3 months you will required to repeat an orientation and, if greater than 6 months, you will be required to repeat an orientation, labs and an EKG.            Additional Tips     - Consume your 4 daily meal replacements equally spaced over the day  No more than 6 hours between each meal  Breakfast is especially important     - Get the support of family and friends     - Snack-proof your house     - Have strategies for social situations, meetings that run over or vacation        - When you fall off the plan it's no big deal; just start right back. Turn those days into examples of what to change or avoid next time.           Homework for [employer]    Exercise    Exercise daily   - Start slow, gradually increase your time by around 10 to 20 % a week    - To prevent injury, take a recovery week every 4 weeks (reduce your exercise time and intensity by 1/2 during this time)    - Your goal is to work up to 150 min a week; hard enough that you can't whistle or sing. It may take 6 months to work up to this. - Call the Health  at [facility] labs for exercise ideas ([phone])          Diet          Common Side Effects    -Constipation  -Fatigue  -Hunger  -Low sodium  -Hair Loss      1. Constipation        -around 1 out of 5 chance it happens at all       -around 1 out of 10 chance you'll need to take something (see below)    It can be prevented by Drinking at least 8 glasses of water a day    If you're prone to constipation:  - Take a Stool Softener every day:  (eg - Dulcolax Stool Softener 100 mg or Miralax)  - Eat Plenty of Fiber   Get the New Direction products with fiber added   Keep your fiber intake to at least 20 grams a day   Use SUGAR-FREE products: Fiber One products, Benefiber or Metamucil      If you get constipated:  1. Start with a Stool Softener (produces a bowel movement in 72 hr):   Examples:    Miralax 1 capful twice a day (It's OK to go up to 3 caps for a few days)    Dulcolax Stool Softener 100 mg    2.   If you still have constipation after 2 or 3 days, add a Stimulant Laxative to the Stool Softener (this will produce a bowel movement in 6 - 12 hr):   Examples:    Exlax (1 small square) for up to 4 days    Dulcolax stimulant laxative    Magnesium citrate pill 500 mg start with once a day and go up to 3 times a day if needed    3. Or you can go straight to a combination Stool Softner / Stimulant at night. If you need, you can add a morning dose. Examples:    Pericolace 50 mg / 8.25 mg    Sennakot-S  50 mg / 8.25 mg    4. If You're Really locked up, get Liquid Magnesium Citrate (take according to the      directions)      2. Fatigue       -Everyone goes through this stage  More common in the first 2 weeks  It's your body adjusting to the Ketogenic diet and it's normal       3. Hunger  More common in the first few days  After your body adjusts to the Ketogenic diet it will go away       4. Low blood levels of sodium       -Not very common, especially if you're not taking a fluid pil  If you develop a headache, feel fatigued, light-headed or dizzy  Add 1/2 cup of bouillon broth every day      5. Hair loss       -This is fairly uncommon; you may see more than a usual amount of hair in your brush or the shower drain  This can happen with physical stress (surgery, trauma or calorie restriction) or psychological stress. Although is it very concerning, it is often temporary and will resolve within 3 months. Some people notice a benefit from taking a daily dose of Biotin 2,500 mcg and increasing their Fish Oil intake.       Other Tips and Helpful Information    - Get the following books (all found on [address] [facility] [address])    Change Anything by Sussy Sabillon, Francy Messer, and Kristin Haas    Mindless Eating by Lana Flood    Perfectly Yourself by Maldonado Ballard    Me, Myself and I - 28 Days to Self-Love by Brown Esqueda your 4 daily meal replacements equally spaced over the    day   No more than 6 hours between each meal   Breakfast is especially important    - Get the support of family and friends    - Snack-proof your house    - Have strategies for social situations, meetings that run over or vacation      - When you fall off the plan it's no big deal; just start right back; turn those days into examples of what to avoid next time. Long-term Healthy Weight / Body Fat  Different ways to determining your ideal weight:  1. Keep your waist to less than 1/2 of your height   2. Keep your % body fat to under 30% for female and under 20% if male  3. Keep your BMI around 25        Supplements      1. Vitacost Synergy Basic Multi-Vitamin (Their In-House Brand)      Take 1 capsule twice a day        Found ONLY at [facility], NOT at [facility], [facility], [facility], the Vitamin Shoppe, etc      SKU #: [id number]       $16.49   / 1 month supply      www. [employer].com  [phone]       2. N-Acetyl Cysteine (NAC) -- 600 mg       1 pill once a day       Found at many stores but [employer] has a good price:       Item #: [id number]  $9.99 / 120 pills (4 month supply)      3. Turmeric with Black Pepper Extract (or Bioperine) 500 mg      1 pill 1-2 times a day (more is joint pain or increased inflammation)      Found at many stores but [employer] has a good price:      SKU #: [id number]  $13.64 / 60 pills        4.  Fish Oil      Take 1 capsule a day      Vitamin Shoppe Brand: \"Omega 3 Fish Oil (per pill:  )\"                                                               OR    Take 1 Tbsp 3 days a week of any of the following:    Vitamin Shoppe Brand: Lemon or Orange flavored Fish Oil   Nutra Sea + D:  Apple flavored   Nutra Sea:  Serafin flavored   (These are found at most Vitamin Stores or on [employer])    FYI:   Typical fish oil per pill =  mg and  mg  Krill oil per pill = EPA 50 - 70 mg and DHA 27 - 250 mg      ^^^^^^^^^^^^^^^^^^^^^^^^^^^^^^^^^^^^^^^^^^^^^^^^^^^^^^^^^^^^^^^^^^^^^^^^^^^^^^^^^^^^^^^^^^^^^^^      Carbohydrates     If you do not metabolize carbohydrates well, you will lose weight and keep the weigh off by keeping your carbohydrate intake low.  How do you know if you do not metabolize carbs well? If your Triglycerides, sdLCL-C and Insulin Resistance are elevated, then you will do well to follow a low carb diet. Fun Facts About Carbs    - The Typical American Diet = 450 grams a day    - The Reducing Phase of the VLCD = 40 grams a day    - Target for weight loss maintenance:   - Eat no more than 30 grams per meal   - Eat no more than 10 grams per snack (mid-morning and mid-afternoon snack)        Resources for finding out where carbs hide in our foods:  1. Our Registered Dietitians    2. Www. Atkins. com/tools    3. Read food labels    4. Books       - The Calorie [place]       - The Kostas System Diet for a New You       - Kaylyn Mosqueda's NEW Carb and Calorie 4th Edition (my favorite)    5. Smart phone and Internet-based apps       - 3LeafPal        - Carb Manager      If you want to get a better picture of your cardiac risk, consider getting a coronary calcium score:  Call [phone] to schedule one.

## 2017-06-16 NOTE — PROGRESS NOTES
VLCD Progress Note: Initial Physician Visit    Myke Gonzalez is a 38 y.o. female who is here for medical screening for the VLCD Program.      Weight History  Current weight 227 Body mass index is 41.63 kg/(m^2). Goal weight 150  Highest weight 25, at 38years old    Weight loss History  How many weight loss attempts have you had? At least 6  Which program were you most successful at? None     Significant Medical History  MI w/ in the last 3 months: no  Type I Diabetes: no  Type II Diabetes: no  Liver or Kidney disease requiring protein restriction: no, pt has 1 kidney with normal kidney function  Pregnant or planning on becoming pregnant w/in 6 months: no  Recent treatment for Cancer: no  History of Uric-acid Kidney Stone or elevated Uric Acid: no  Peptic ulcer disease not well controlled: no  Recent onset of Inflammatory Bowel Disease: no     If female:  No LMP recorded. Patient has had a hysterectomy.     Significant Psychosocial History  Major lifestyle changes: no   Other commitments: no  Any potential unsupportive: no     Current psychotherapy: no  Ever hospitalized for psychiatric reasons: no  History of depression: yes  History of suicidal ideation: no  Dramatic mood changes during dieting: yes  History of binge eating: no  If yes, is there a history of purging: no    Social History  Social History   Substance Use Topics    Smoking status: Never Smoker    Smokeless tobacco: Never Used    Alcohol use 0.6 oz/week     1 Shots of liquor per week      Comment:  rarely drink   Q 3 mos. Has Clarissa ever been told by a physician not to exercise: no    Does Clarissa know of any reason they shouldn't exercise: no      Does Clarissa have any food allergies or sensitivities: no    Allergies include:    Allergies   Allergen Reactions    Morphine Other (comments)     GI DISTRESS       Sulfa (Sulfonamide Antibiotics) Other (comments)     GI DISTRESS     Sulfamethoxazole-Trimethoprim Unknown (comments)    Vicodin [Hydrocodone-Acetaminophen] Other (comments)     Psychological Reaction          Objective    Visit Vitals    /62    Pulse 67    Temp 96.8 °F (36 °C) (Oral)    Resp 20    Ht 5' 2\" (1.575 m)    Wt 227 lb 9.6 oz (103.2 kg)    SpO2 95%  Comment: ra    BMI 41.63 kg/m2     Appearance: Obese, A&O x 3, NAD  HEENT:  NC/AT, PERRL  Neck:  No nodes, no JVD  Heart:  RRR without M/R/G  Lungs:  CTAB, no rhonchi, rales, or wheezes with good air exchange   Abdomen:  Non-tender, pos bowel sounds, no hepatosplenomegally  Ext:  No C/C/E      Labs    [facility] labs reviewed extensively with patient. Will continue with monthly CMP, uric acid checks    CMP:   Lab Results   Component Value Date/Time    Glucose 83 02/15/2017 11:17 AM    Sodium 140 02/15/2017 11:17 AM    Potassium 3.9 02/15/2017 11:17 AM    Chloride 105 02/15/2017 11:17 AM    CO2 28 02/15/2017 11:17 AM    BUN 15 02/15/2017 11:17 AM    Creatinine 0.89 02/15/2017 11:17 AM    Calcium 8.5 02/15/2017 11:17 AM    Anion gap 7 02/15/2017 11:17 AM    BUN/Creatinine ratio 17 02/15/2017 11:17 AM      Lab Results   Component Value Date/Time    Cholesterol, total 172 05/05/2017 11:44 AM    HDL Cholesterol 43 05/05/2017 11:44 AM    LDL, calculated 89.4 05/05/2017 11:44 AM    Triglyceride 198 05/05/2017 11:44 AM    CHOL/HDL Ratio 4.0 05/05/2017 11:44 AM         Assessment/Plan    ICD-10-CM ICD-9-CM    1.  Morbid obesity with BMI of 40.0-44.9, adult (HCC) E66.01 278.01 AMB POC EKG ROUTINE W/ 12 LEADS, INTER & REP    Z68.41 V85.41        Diet regimen   # of meal replacements prescribed: 4   If modified LCD-nutritional guidelines:    Monthly Goal   10-15 lbs weight loss    Medical monitoring schedule:   Weekly BP/Weight checks   Monthly provider appointments

## 2017-06-23 ENCOUNTER — CLINICAL SUPPORT (OUTPATIENT)
Dept: FAMILY MEDICINE CLINIC | Age: 42
End: 2017-06-23

## 2017-06-23 VITALS
BODY MASS INDEX: 40.89 KG/M2 | HEART RATE: 77 BPM | WEIGHT: 222.2 LBS | HEIGHT: 62 IN | DIASTOLIC BLOOD PRESSURE: 67 MMHG | SYSTOLIC BLOOD PRESSURE: 120 MMHG

## 2017-06-23 DIAGNOSIS — E66.01 MORBID OBESITY WITH BMI OF 40.0-44.9, ADULT (HCC): Primary | ICD-10-CM

## 2017-06-29 ENCOUNTER — TELEPHONE (OUTPATIENT)
Dept: FAMILY MEDICINE CLINIC | Age: 42
End: 2017-06-29

## 2017-06-30 ENCOUNTER — CLINICAL SUPPORT (OUTPATIENT)
Dept: FAMILY MEDICINE CLINIC | Age: 42
End: 2017-06-30

## 2017-06-30 VITALS
DIASTOLIC BLOOD PRESSURE: 75 MMHG | HEIGHT: 62 IN | SYSTOLIC BLOOD PRESSURE: 119 MMHG | BODY MASS INDEX: 40.69 KG/M2 | WEIGHT: 221.1 LBS

## 2017-06-30 DIAGNOSIS — E66.01 MORBID OBESITY WITH BMI OF 40.0-44.9, ADULT (HCC): Primary | ICD-10-CM

## 2017-06-30 NOTE — PROGRESS NOTES
Progress Note: Weekly Medical Monitoring in the [facility] Loss Program    Is there anything that you or the patient needs to let the supervising provider know about? no    Over the past week, have you experienced any side-effects? no    Lauren Jorge is a 38 y.o. female who is enrolled in [facility]    Lauren Jorge was prescribed the VLCD / LCD. Visit Vitals    /75    Ht 5' 2\" (1.575 m)    Wt 221 lb 1.6 oz (100.3 kg)    BMI 40.44 kg/m2     Weight Metrics 6/30/2017 6/23/2017 6/16/2017 6/16/2017 6/9/2017 5/12/2017 5/9/2017   Weight 221 lb 1.6 oz 222 lb 3.2 oz - 227 lb 9.6 oz 226 lb 9.6 oz 225 lb 6.4 oz 226 lb   Waist Measure Inches 47.5 47.5 - - - - -   Body Fat % - - 41.6 - - - -   BMI 40.44 kg/m2 40.64 kg/m2 - 41.63 kg/m2 41.45 kg/m2 41.23 kg/m2 41.34 kg/m2         Have you received any other medical care this week? no      Have you had any change in your medications since your last visit? no      Did you have any problems adhering to the program last week? yes  If yes, please explain: Pt states she misses her spicy foods. Eating Habits Over Last Week:  Did you take in 64 oz of non-caloric fluids? yes     Did you consume your prescribed meal replacement regimen each day?  yes       Physical Activity Over the Past Week:    Aerobic exercise: 0 min  Resistance exercise: 0 workouts / week

## 2017-06-30 NOTE — TELEPHONE ENCOUNTER
Please let pt know, it is not a good idea, as the diet is a diuretic and the chance for side effects would be multiplied.

## 2017-07-07 ENCOUNTER — HOSPITAL ENCOUNTER (OUTPATIENT)
Dept: LAB | Age: 42
Discharge: HOME | End: 2017-07-07
Payer: COMMERCIAL

## 2017-07-07 ENCOUNTER — LAB ONLY (OUTPATIENT)
Dept: FAMILY MEDICINE CLINIC | Age: 42
End: 2017-07-07

## 2017-07-07 ENCOUNTER — OFFICE VISIT (OUTPATIENT)
Dept: FAMILY MEDICINE CLINIC | Age: 42
End: 2017-07-07

## 2017-07-07 ENCOUNTER — CLINICAL SUPPORT (OUTPATIENT)
Dept: FAMILY MEDICINE CLINIC | Age: 42
End: 2017-07-07

## 2017-07-07 VITALS
DIASTOLIC BLOOD PRESSURE: 60 MMHG | WEIGHT: 215.4 LBS | HEART RATE: 74 BPM | BODY MASS INDEX: 39.64 KG/M2 | SYSTOLIC BLOOD PRESSURE: 104 MMHG | HEIGHT: 62 IN

## 2017-07-07 DIAGNOSIS — E66.01 MORBID OBESITY WITH BMI OF 40.0-44.9, ADULT (HCC): Primary | ICD-10-CM

## 2017-07-07 DIAGNOSIS — E66.01 MORBID OBESITY WITH BMI OF 40.0-44.9, ADULT (HCC): ICD-10-CM

## 2017-07-07 LAB
ALBUMIN SERPL BCP-MCNC: 3.7 G/DL (ref 3.4–5)
ALBUMIN/GLOB SERPL: 1.2 {RATIO} (ref 0.8–1.7)
ALP SERPL-CCNC: 56 U/L (ref 45–117)
ALT SERPL-CCNC: 37 U/L (ref 13–56)
ANION GAP BLD CALC-SCNC: 9 MMOL/L (ref 3–18)
AST SERPL W P-5'-P-CCNC: 22 U/L (ref 15–37)
BILIRUB SERPL-MCNC: 0.5 MG/DL (ref 0.2–1)
BUN SERPL-MCNC: 13 MG/DL (ref 7–18)
BUN/CREAT SERPL: 16 (ref 12–20)
CALCIUM SERPL-MCNC: 8.4 MG/DL (ref 8.5–10.1)
CHLORIDE SERPL-SCNC: 106 MMOL/L (ref 100–108)
CO2 SERPL-SCNC: 26 MMOL/L (ref 21–32)
CREAT SERPL-MCNC: 0.81 MG/DL (ref 0.6–1.3)
GLOBULIN SER CALC-MCNC: 3.1 G/DL (ref 2–4)
GLUCOSE SERPL-MCNC: 57 MG/DL (ref 74–99)
POTASSIUM SERPL-SCNC: 3.8 MMOL/L (ref 3.5–5.5)
PROT SERPL-MCNC: 6.8 G/DL (ref 6.4–8.2)
SODIUM SERPL-SCNC: 141 MMOL/L (ref 136–145)
URATE SERPL-MCNC: 5.6 MG/DL (ref 2.6–7.2)

## 2017-07-07 PROCEDURE — 84550 ASSAY OF BLOOD/URIC ACID: CPT | Performed by: NURSE PRACTITIONER

## 2017-07-07 PROCEDURE — 80053 COMPREHEN METABOLIC PANEL: CPT | Performed by: NURSE PRACTITIONER

## 2017-07-07 NOTE — PROGRESS NOTES
Progress Note: Weekly Medical Monitoring in the [facility]    Is there anything that you or the patient needs to let the supervising provider know about? no    Over the past week, have you experienced any side-effects? no    Gertha Curling is a 38 y.o. female who is enrolled in [facility]    Gertha Curling was prescribed the VLCD / LCD. Visit Vitals    /60    Pulse 74    Ht 5' 2\" (1.575 m)    Wt 215 lb 6.4 oz (97.7 kg)    BMI 39.4 kg/m2     Weight Metrics 7/7/2017 6/30/2017 6/23/2017 6/16/2017 6/16/2017 6/9/2017 5/12/2017   Weight 215 lb 6.4 oz 221 lb 1.6 oz 222 lb 3.2 oz - 227 lb 9.6 oz 226 lb 9.6 oz 225 lb 6.4 oz   Waist Measure Inches 47.5 47.5 47.5 - - - -   Body Fat % - - - 41.6 - - -   BMI 39.4 kg/m2 40.44 kg/m2 40.64 kg/m2 - 41.63 kg/m2 41.45 kg/m2 41.23 kg/m2         Have you received any other medical care this week? no      Have you had any change in your medications since your last visit? no    Did you have any problems adhering to the program last week? no      Eating Habits Over Last Week:  Did you take in 64 oz of non-caloric fluids? yes     Did you consume your prescribed meal replacement regimen each day? yes       Physical Activity Over the Past Week:    Aerobic exercise:  Yard work and walking   Resistance exercise: 0 workouts / week

## 2017-07-12 NOTE — TELEPHONE ENCOUNTER
Left message for patient stating not a good idea to donate plasma due to her while being on the mswl program per yifan.

## 2017-07-14 ENCOUNTER — OFFICE VISIT (OUTPATIENT)
Dept: FAMILY MEDICINE CLINIC | Age: 42
End: 2017-07-14

## 2017-07-14 VITALS
OXYGEN SATURATION: 96 % | TEMPERATURE: 96.1 F | SYSTOLIC BLOOD PRESSURE: 102 MMHG | BODY MASS INDEX: 39.43 KG/M2 | RESPIRATION RATE: 18 BRPM | DIASTOLIC BLOOD PRESSURE: 59 MMHG | HEIGHT: 62 IN | HEART RATE: 74 BPM | WEIGHT: 214.3 LBS

## 2017-07-14 DIAGNOSIS — E66.01 MORBID OBESITY WITH BMI OF 40.0-44.9, ADULT (HCC): Primary | ICD-10-CM

## 2017-07-14 NOTE — PATIENT INSTRUCTIONS
Monthly goal:    10-15 lbs weight loss    Follow up with your primary care about sleep specialist.       Body Mass Index: Care Instructions  Your Care Instructions    Body mass index (BMI) can help you see if your weight is raising your risk for health problems. It uses a formula to compare how much you weigh with how tall you are. · A BMI lower than 18.5 is considered underweight. · A BMI between 18.5 and 24.9 is considered healthy. · A BMI between 25 and 29.9 is considered overweight. A BMI of 30 or higher is considered obese. If your BMI is in the normal range, it means that you have a lower risk for weight-related health problems. If your BMI is in the overweight or obese range, you may be at increased risk for weight-related health problems, such as high blood pressure, heart disease, stroke, arthritis or joint pain, and diabetes. If your BMI is in the underweight range, you may be at increased risk for health problems such as fatigue, lower protection (immunity) against illness, muscle loss, bone loss, hair loss, and hormone problems. BMI is just one measure of your risk for weight-related health problems. You may be at higher risk for health problems if you are not active, you eat an unhealthy diet, or you drink too much alcohol or use tobacco products. Follow-up care is a key part of your treatment and safety. Be sure to make and go to all appointments, and call your doctor if you are having problems. It's also a good idea to know your test results and keep a list of the medicines you take. How can you care for yourself at home? · Practice healthy eating habits. This includes eating plenty of fruits, vegetables, whole grains, lean protein, and low-fat dairy. · If your doctor recommends it, get more exercise. Walking is a good choice. Bit by bit, increase the amount you walk every day. Try for at least 30 minutes on most days of the week. · Do not smoke.  Smoking can increase your risk for health problems. If you need help quitting, talk to your doctor about stop-smoking programs and medicines. These can increase your chances of quitting for good. · Limit alcohol to 2 drinks a day for men and 1 drink a day for women. Too much alcohol can cause health problems. If you have a BMI higher than 25  · Your doctor may do other tests to check your risk for weight-related health problems. This may include measuring the distance around your waist. A waist measurement of more than 40 inches in men or 35 inches in women can increase the risk of weight-related health problems. · Talk with your doctor about steps you can take to stay healthy or improve your health. You may need to make lifestyle changes to lose weight and stay healthy, such as changing your diet and getting regular exercise. If you have a BMI lower than 18.5  · Your doctor may do other tests to check your risk for health problems. · Talk with your doctor about steps you can take to stay healthy or improve your health. You may need to make lifestyle changes to gain or maintain weight and stay healthy, such as getting more healthy foods in your diet and doing exercises to build muscle. Where can you learn more? Go to http://roro-kenyatta.info/. Enter S176 in the search box to learn more about \"Body Mass Index: Care Instructions. \"  Current as of: January 23, 2017  Content Version: 11.3  © 7521-6292 [employer]. Care instructions adapted under license by [employer] (which disclaims liability or warranty for this information). If you have questions about a medical condition or this instruction, always ask your healthcare professional. [place] [address] any warranty or liability for your use of this information.

## 2017-07-14 NOTE — PROGRESS NOTES
[facility] Progress Note:   F/up Physician Visit    CC: weight loss management      Teofilo Marie is a 38 y.o. female who is here for her  f/up physician visit for the VLCD Program. Stiven Corley has completed 4 weeks of the program to date. 13 lbs weight loss since last visit! Feeling well, a few episodes of lightheadedness, tiredness. States feels like she has sleep apnea, wakes up shaky, does not know if she snores or stops breathing during sleep, deferred back to primary care. Doing well on program without complaints. Weight hx:    Today's weight:  214 lbs  starting weight 227 Body mass index is 41.63 kg/(m^2). Goal weight 150  Highest weight 25, at 38years old    EK lbs    Weight Metrics 2017   Weight - 214 lb 4.8 oz - 215 lb 6.4 oz 221 lb 1.6 oz 222 lb 3.2 oz -   Waist Measure Inches 47 - 47.5 - 47.5 47.5 -   Exercise Mins/week 0 - - - - - -   Body Fat % 42.6 - - - - - 41.6   BMI - 39.2 kg/m2 - 39.4 kg/m2 40.44 kg/m2 40.64 kg/m2 -         Current Outpatient Prescriptions   Medication Sig Dispense Refill    ALPRAZolam (XANAX) 1 mg tablet Take 1 Tab by mouth as needed. Max Daily Amount: 1 mg. 30 Tab 2    ibuprofen (MOTRIN) 600 mg tablet 600 mg.      albuterol (PROVENTIL HFA, VENTOLIN HFA, PROAIR HFA) 90 mcg/actuation inhaler 1-2 Puffs. Participation   Did you attend clinic and class last week? no    Review of Systems  Since your last visit, have you experienced any complications? no  If yes, please list: no    No CP, SOB, palpitations, lightheadedness, dizziness, constipation. Positives highlight in BOLD. Are you taking an appetite suppressant? no  If so, is there any Chest Pain, Palpitations or Dizziness?   BP Readings from Last 10 Encounters:   17 102/59   17 104/60   17 119/75   17 120/67   17 104/62   17 105/68   17 101/64   17 120/70   17 115/73   04/28/17 112/84         Have you received any other medical care this week? no  If yes, where and for what? Have you discontinued or changed any medicine or dose of your medicine since your last visit? no  If yes, where and for what? Diet  How many ounces of calorie-free liquids did you consume each day? 128 oz    How many meal replacements did you take each day? 4    Did you have any problems adhering to the program?  no If yes, please explain: no      Exercise  Aerobic exercise: 0 min  Resistance exercise: 0 workouts / week  Any discomfort?  no     If yes, where? Review of Systems  Complete ROS negative except where noted above    Objective  Visit Vitals    /59 (BP 1 Location: Left arm, BP Patient Position: Sitting)    Pulse 74    Temp 96.1 °F (35.6 °C) (Oral)    Resp 18    Ht 5' 2\" (1.575 m)    Wt 214 lb 4.8 oz (97.2 kg)    SpO2 96%    BMI 39.2 kg/m2     No LMP recorded. Patient has had a hysterectomy. Waist Circumference: I personally reviewed patient's Weight Management Doc Flowsheet  Neck Circumference: I personally reviewed patient's Weight Management Doc Flowsheet  Percent Body Fat: I personally reviewed patient's Weight Management Doc Flowsheet    Physical Exam  Appearance: well appearing, A&O, NAD  HEENT:  NC/AT, PERRL, No scleral icterus  Heart:  RRR without M/R/G  Lungs:  CTAB, no rhonchi, rales, or wheezes with good air exchange   Ext:  No LE Edema    Assessment / Plan    Encounter Diagnosis   Name Primary?  Morbid obesity with BMI of 40.0-44.9, adult ([facility] [address] Yes       1. Weight management well controlled, improved   Progress was reviewed with patient    2. Labs    Latest results reviewed with patient   Lab slip given to pt for f/up HDL labs    3.  Diet regimen   # of meal replacements prescribed: 4   If modified LCD-nutritional guidelines:    Monthly Goal   As below    Medical monitoring schedule:   Weekly BP/Weight checks   Monthly provider appointments          Patient Instructions   Monthly goal:    10-15 lbs weight loss    Follow up with your primary care about sleep specialist.       Body Mass Index: Care Instructions  Your Care Instructions    Body mass index (BMI) can help you see if your weight is raising your risk for health problems. It uses a formula to compare how much you weigh with how tall you are. · A BMI lower than 18.5 is considered underweight. · A BMI between 18.5 and 24.9 is considered healthy. · A BMI between 25 and 29.9 is considered overweight. A BMI of 30 or higher is considered obese. If your BMI is in the normal range, it means that you have a lower risk for weight-related health problems. If your BMI is in the overweight or obese range, you may be at increased risk for weight-related health problems, such as high blood pressure, heart disease, stroke, arthritis or joint pain, and diabetes. If your BMI is in the underweight range, you may be at increased risk for health problems such as fatigue, lower protection (immunity) against illness, muscle loss, bone loss, hair loss, and hormone problems. BMI is just one measure of your risk for weight-related health problems. You may be at higher risk for health problems if you are not active, you eat an unhealthy diet, or you drink too much alcohol or use tobacco products. Follow-up care is a key part of your treatment and safety. Be sure to make and go to all appointments, and call your doctor if you are having problems. It's also a good idea to know your test results and keep a list of the medicines you take. How can you care for yourself at home? · Practice healthy eating habits. This includes eating plenty of fruits, vegetables, whole grains, lean protein, and low-fat dairy. · If your doctor recommends it, get more exercise. Walking is a good choice. Bit by bit, increase the amount you walk every day. Try for at least 30 minutes on most days of the week. · Do not smoke. Smoking can increase your risk for health problems. If you need help quitting, talk to your doctor about stop-smoking programs and medicines. These can increase your chances of quitting for good. · Limit alcohol to 2 drinks a day for men and 1 drink a day for women. Too much alcohol can cause health problems. If you have a BMI higher than 25  · Your doctor may do other tests to check your risk for weight-related health problems. This may include measuring the distance around your waist. A waist measurement of more than 40 inches in men or 35 inches in women can increase the risk of weight-related health problems. · Talk with your doctor about steps you can take to stay healthy or improve your health. You may need to make lifestyle changes to lose weight and stay healthy, such as changing your diet and getting regular exercise. If you have a BMI lower than 18.5  · Your doctor may do other tests to check your risk for health problems. · Talk with your doctor about steps you can take to stay healthy or improve your health. You may need to make lifestyle changes to gain or maintain weight and stay healthy, such as getting more healthy foods in your diet and doing exercises to build muscle. Where can you learn more? Go to http://roro-kenyatta.info/. Enter S176 in the search box to learn more about \"Body Mass Index: Care Instructions. \"  Current as of: January 23, 2017  Content Version: 11.3  © 4252-4038 [employer]. Care instructions adapted under license by [employer] (which disclaims liability or warranty for this information). If you have questions about a medical condition or this instruction, always ask your healthcare professional. [place] [address] any warranty or liability for your use of this information.           Follow-up Disposition: Not on File      10 minutes of the 15 minutes face to face time with Clarissa consisted of counseling & coordinating and/or discussing treatment plans in reference to her The encounter diagnosis was Morbid obesity with BMI of 40.0-44.9, adult ([facility] [address] The patient is to follow up as scheduled and will report to the ED or the office if symptoms change or increase. The patient has voiced understanding and will comply.

## 2017-07-14 NOTE — MR AVS SNAPSHOT
Visit Information Date & Time Provider Department Dept. Phone Encounter #  
 7/14/2017  8:30 AM Erick De La Fuente NP [address] [facility] [phone] [id number] Your Appointments 8/11/2017  9:30 AM  
Follow Up with Vin Lopez MD  
[facility]) Appt Note: 3 mon f/u  
 [address] [facility] [address] [facility] [address]  
[phone]  
  
   
 [address] [facility] [address]  
  
    
 10/27/2017 10:00 AM  
ESTABLISHED PATIENT with Shahrzad Elizabeth MD  
[facility] [place] [address] ([facility]) Appt Note: 6MO F/UP STRESS INCONT  
 [address] [facility] [address]  
[phone]  
  
   
 [facility] [address] [address] Upcoming Health Maintenance Date Due DTaP/Tdap/Td series (1 - Tdap) 4/30/1994 PAP AKA CERVICAL CYTOLOGY 4/30/1994 INFLUENZA AGE 9 TO ADULT 8/1/2017 Allergies as of 7/14/2017  Review Complete On: 7/14/2017 By: Erick De La Fuente NP Severity Noted Reaction Type Reactions Morphine  09/21/2016    Other (comments) GI DISTRESS Sulfa (Sulfonamide Antibiotics)  09/21/2016    Other (comments) GI DISTRESS Sulfamethoxazole-trimethoprim  01/01/2017    Unknown (comments) Vicodin [Hydrocodone-acetaminophen]  09/21/2016    Other (comments) Psychological Reaction Current Immunizations  Never Reviewed No immunizations on file. Not reviewed this visit You Were Diagnosed With   
  
 Codes Comments Morbid obesity with BMI of 40.0-44.9, adult [facility]    -  Primary ICD-10-CM: E66.01, Z68.41 
ICD-9-CM: 278.01, V85.41 Vitals BP Pulse Temp Resp Height(growth percentile) Weight(growth percentile) 102/59 (BP 1 Location: Left arm, BP Patient Position: Sitting) 74 96.1 °F (35.6 °C) (Oral) 18 5' 2\" (1.575 m) 214 lb 4.8 oz (97.2 kg) SpO2 BMI OB Status Smoking Status 96% 39.2 kg/m2 Hysterectomy Never Smoker BMI and BSA Data Body Mass Index Body Surface Area  
 39.2 kg/m 2 2.06 m 2 Preferred Pharmacy Pharmacy Name Phone [facility] [address] [facility] [phone] Your Updated Medication List  
  
   
This list is accurate as of: 7/14/17  8:41 AM.  Always use your most recent med list.  
  
  
  
  
 albuterol 90 mcg/actuation inhaler Commonly known as:  PROVENTIL HFA, VENTOLIN HFA, PROAIR HFA  
1-2 Puffs. ALPRAZolam 1 mg tablet Commonly known as:  Darus Breeding Take 1 Tab by mouth as needed. Max Daily Amount: 1 mg.  
  
 ibuprofen 600 mg tablet Commonly known as:  MOTRIN  
600 mg. Patient Instructions Monthly goal: 
 
10-15 lbs weight loss Follow up with your primary care about sleep specialist. 
 
  
Body Mass Index: Care Instructions Your Care Instructions Body mass index (BMI) can help you see if your weight is raising your risk for health problems. It uses a formula to compare how much you weigh with how tall you are. · A BMI lower than 18.5 is considered underweight. · A BMI between 18.5 and 24.9 is considered healthy. · A BMI between 25 and 29.9 is considered overweight. A BMI of 30 or higher is considered obese. If your BMI is in the normal range, it means that you have a lower risk for weight-related health problems. If your BMI is in the overweight or obese range, you may be at increased risk for weight-related health problems, such as high blood pressure, heart disease, stroke, arthritis or joint pain, and diabetes. If your BMI is in the underweight range, you may be at increased risk for health problems such as fatigue, lower protection (immunity) against illness, muscle loss, bone loss, hair loss, and hormone problems. BMI is just one measure of your risk for weight-related health problems. You may be at higher risk for health problems if you are not active, you eat an unhealthy diet, or you drink too much alcohol or use tobacco products. Follow-up care is a key part of your treatment and safety. Be sure to make and go to all appointments, and call your doctor if you are having problems. It's also a good idea to know your test results and keep a list of the medicines you take. How can you care for yourself at home? · Practice healthy eating habits. This includes eating plenty of fruits, vegetables, whole grains, lean protein, and low-fat dairy. · If your doctor recommends it, get more exercise. Walking is a good choice. Bit by bit, increase the amount you walk every day. Try for at least 30 minutes on most days of the week. · Do not smoke. Smoking can increase your risk for health problems. If you need help quitting, talk to your doctor about stop-smoking programs and medicines. These can increase your chances of quitting for good. · Limit alcohol to 2 drinks a day for men and 1 drink a day for women. Too much alcohol can cause health problems. If you have a BMI higher than 25 · Your doctor may do other tests to check your risk for weight-related health problems. This may include measuring the distance around your waist. A waist measurement of more than 40 inches in men or 35 inches in women can increase the risk of weight-related health problems. · Talk with your doctor about steps you can take to stay healthy or improve your health. You may need to make lifestyle changes to lose weight and stay healthy, such as changing your diet and getting regular exercise. If you have a BMI lower than 18.5 · Your doctor may do other tests to check your risk for health problems. · Talk with your doctor about steps you can take to stay healthy or improve your health.  You may need to make lifestyle changes to gain or maintain weight and stay healthy, such as getting more healthy foods in your diet and doing exercises to build muscle. Where can you learn more? Go to http://roro-kenyatta.info/. Enter S176 in the search box to learn more about \"Body Mass Index: Care Instructions. \" Current as of: January 23, 2017 Content Version: 11.3 © 6859-9200 [employer]. Care instructions adapted under license by [employer] (which disclaims liability or warranty for this information). If you have questions about a medical condition or this instruction, always ask your healthcare professional. [address] any warranty or liability for your use of this information. Introducing [address]! Dear Piter Mcclain: 
Thank you for requesting a [employer] account. Our records indicate that you already have an active [employer] account. You can access your account anytime at [email or web address]. [employer]/[place] Did you know that you can access your hospital and ER discharge instructions at any time in [employer]? You can also review all of your test results from your hospital stay or ER visit. Additional Information If you have questions, please visit the Frequently Asked Questions section of the [employer] website at [email or web address] [employer]/[place]/. Remember, [employer] is NOT to be used for urgent needs. For medical emergencies, dial 911. Now available from your iPhone and Android! Please provide this summary of care documentation to your next provider. Your primary care clinician is listed as Avinash Small. If you have any questions after today's visit, please call [phone].

## 2017-08-22 ENCOUNTER — DOCUMENTATION ONLY (OUTPATIENT)
Dept: FAMILY MEDICINE CLINIC | Age: 42
End: 2017-08-22

## 2017-08-22 NOTE — PROGRESS NOTES
Patient was seen on 7/7/17 and purchased $145 worth of [employer]. (Sale # [id number]). Patient used FSA card and only $30.35 was authorized. PSR's have been attempting to collect via phone calls and patient has not showed for the rest of her appointments. Patient has been discharged from the Medical Weight Loss program until $114.65 can be collected and patient is compliant with her medical supervision. Patient account was noted and archived in [address] [place] [address].

## 2017-10-19 ENCOUNTER — HOSPITAL ENCOUNTER (OUTPATIENT)
Dept: LAB | Age: 42
Discharge: HOME | End: 2017-10-19
Payer: COMMERCIAL

## 2017-10-19 ENCOUNTER — OFFICE VISIT (OUTPATIENT)
Dept: FAMILY MEDICINE CLINIC | Age: 42
End: 2017-10-19

## 2017-10-19 VITALS
DIASTOLIC BLOOD PRESSURE: 74 MMHG | RESPIRATION RATE: 17 BRPM | HEIGHT: 62 IN | TEMPERATURE: 98 F | OXYGEN SATURATION: 98 % | SYSTOLIC BLOOD PRESSURE: 110 MMHG | BODY MASS INDEX: 40.48 KG/M2 | HEART RATE: 76 BPM | WEIGHT: 220 LBS

## 2017-10-19 DIAGNOSIS — R19.7 DIARRHEA, UNSPECIFIED TYPE: ICD-10-CM

## 2017-10-19 DIAGNOSIS — R53.83 FATIGUE, UNSPECIFIED TYPE: ICD-10-CM

## 2017-10-19 DIAGNOSIS — R53.83 FATIGUE, UNSPECIFIED TYPE: Primary | ICD-10-CM

## 2017-10-19 DIAGNOSIS — G47.9 SLEEP DIFFICULTIES: ICD-10-CM

## 2017-10-19 LAB
ANION GAP SERPL CALC-SCNC: 7 MMOL/L (ref 3–18)
APPEARANCE UR: CLEAR
BACTERIA SPEC CULT: NORMAL
BACTERIA URNS QL MICRO: NEGATIVE /HPF
BILIRUB UR QL: NEGATIVE
BUN SERPL-MCNC: 13 MG/DL (ref 7–18)
BUN/CREAT SERPL: 14 (ref 12–20)
CALCIUM SERPL-MCNC: 8.6 MG/DL (ref 8.5–10.1)
CHLORIDE SERPL-SCNC: 106 MMOL/L (ref 100–108)
CO2 SERPL-SCNC: 27 MMOL/L (ref 21–32)
COLOR UR: YELLOW
CREAT SERPL-MCNC: 0.9 MG/DL (ref 0.6–1.3)
EPITH CASTS URNS QL MICRO: NORMAL /LPF (ref 0–5)
ERYTHROCYTE [DISTWIDTH] IN BLOOD BY AUTOMATED COUNT: 13.6 % (ref 11.6–14.5)
GLUCOSE SERPL-MCNC: 87 MG/DL (ref 74–99)
GLUCOSE UR STRIP.AUTO-MCNC: NEGATIVE MG/DL
HCT VFR BLD AUTO: 44.4 % (ref 35–45)
HGB BLD-MCNC: 14.5 G/DL (ref 12–16)
HGB UR QL STRIP: ABNORMAL
KETONES UR QL STRIP.AUTO: NEGATIVE MG/DL
LEUKOCYTE ESTERASE UR QL STRIP.AUTO: ABNORMAL
MCH RBC QN AUTO: 29.5 PG (ref 24–34)
MCHC RBC AUTO-ENTMCNC: 32.7 G/DL (ref 31–37)
MCV RBC AUTO: 90.2 FL (ref 74–97)
NITRITE UR QL STRIP.AUTO: NEGATIVE
PH UR STRIP: 5 [PH] (ref 5–8)
PLATELET # BLD AUTO: 253 K/UL (ref 135–420)
PMV BLD AUTO: 9.8 FL (ref 9.2–11.8)
POTASSIUM SERPL-SCNC: 4 MMOL/L (ref 3.5–5.5)
PROT UR STRIP-MCNC: NEGATIVE MG/DL
RBC # BLD AUTO: 4.92 M/UL (ref 4.2–5.3)
RBC #/AREA URNS HPF: NORMAL /HPF (ref 0–5)
SERVICE CMNT-IMP: NORMAL
SODIUM SERPL-SCNC: 140 MMOL/L (ref 136–145)
SP GR UR REFRACTOMETRY: 1.02 (ref 1–1.03)
TSH SERPL DL<=0.05 MIU/L-ACNC: 1.7 UIU/ML (ref 0.36–3.74)
UROBILINOGEN UR QL STRIP.AUTO: 0.2 EU/DL (ref 0.2–1)
WBC # BLD AUTO: 9.3 K/UL (ref 4.6–13.2)
WBC URNS QL MICRO: NORMAL /HPF (ref 0–4)

## 2017-10-19 PROCEDURE — 86803 HEPATITIS C AB TEST: CPT | Performed by: PHYSICIAN ASSISTANT

## 2017-10-19 PROCEDURE — 85027 COMPLETE CBC AUTOMATED: CPT | Performed by: PHYSICIAN ASSISTANT

## 2017-10-19 PROCEDURE — 84443 ASSAY THYROID STIM HORMONE: CPT | Performed by: PHYSICIAN ASSISTANT

## 2017-10-19 PROCEDURE — 82274 ASSAY TEST FOR BLOOD FECAL: CPT | Performed by: PHYSICIAN ASSISTANT

## 2017-10-19 PROCEDURE — 86308 HETEROPHILE ANTIBODY SCREEN: CPT | Performed by: PHYSICIAN ASSISTANT

## 2017-10-19 PROCEDURE — 87493 C DIFF AMPLIFIED PROBE: CPT | Performed by: PHYSICIAN ASSISTANT

## 2017-10-19 PROCEDURE — 87177 OVA AND PARASITES SMEARS: CPT | Performed by: PHYSICIAN ASSISTANT

## 2017-10-19 PROCEDURE — 81001 URINALYSIS AUTO W/SCOPE: CPT | Performed by: PHYSICIAN ASSISTANT

## 2017-10-19 PROCEDURE — 86664 EPSTEIN-BARR NUCLEAR ANTIGEN: CPT | Performed by: PHYSICIAN ASSISTANT

## 2017-10-19 PROCEDURE — 36415 COLL VENOUS BLD VENIPUNCTURE: CPT | Performed by: PHYSICIAN ASSISTANT

## 2017-10-19 PROCEDURE — 80048 BASIC METABOLIC PNL TOTAL CA: CPT | Performed by: PHYSICIAN ASSISTANT

## 2017-10-19 NOTE — PROGRESS NOTES
Fior Zapata is a 38 y.o. female presents to office for labs, sleep test, and muscle pain      1.  Have you been to the ER, urgent care clinic or hospitalized since your last visit? no        Health Maintenance items with a due date reviewed with patient:  Health Maintenance Due   Topic Date Due    DTaP/Tdap/Td series (1 - Tdap) 04/30/1994    PAP AKA CERVICAL CYTOLOGY  04/30/1994    INFLUENZA AGE 9 TO ADULT  08/01/2017

## 2017-10-19 NOTE — LETTER
NOTIFICATION RETURN TO WORK  
 
10/19/2017 11:18 AM 
 
Ms. Tawana Lockhart [address] [address] To Whom It May Concern: 
 
Tawana Lockhart is currently under the care of [address] [place] [address]. She will return to work on 10/20/17 If there are questions or concerns please have the patient contact our office. Sincerely, GUILLE Ventura

## 2017-10-19 NOTE — PATIENT INSTRUCTIONS
Diarrhea: Care Instructions  Your Care Instructions    Diarrhea is loose, watery stools (bowel movements). The exact cause is often hard to find. Sometimes diarrhea is your body's way of getting rid of what caused an upset stomach. Viruses, food poisoning, and many medicines can cause diarrhea. Some people get diarrhea in response to emotional stress, anxiety, or certain foods. Almost everyone has diarrhea now and then. It usually isn't serious, and your stools will return to normal soon. The important thing to do is replace the fluids you have lost, so you can prevent dehydration. The doctor has checked you carefully, but problems can develop later. If you notice any problems or new symptoms, get medical treatment right away. Follow-up care is a key part of your treatment and safety. Be sure to make and go to all appointments, and call your doctor if you are having problems. It's also a good idea to know your test results and keep a list of the medicines you take. How can you care for yourself at home? · Watch for signs of dehydration, which means your body has lost too much water. Dehydration is a serious condition and should be treated right away. Signs of dehydration are:  ¨ Increasing thirst and dry eyes and mouth. ¨ Feeling faint or lightheaded. ¨ Darker urine, and a smaller amount of urine than normal.  · To prevent dehydration, drink plenty of fluids, enough so that your urine is light yellow or clear like water. Choose water and other caffeine-free clear liquids until you feel better. If you have kidney, heart, or liver disease and have to limit fluids, talk with your doctor before you increase the amount of fluids you drink. · Begin eating small amounts of mild foods the next day, if you feel like it. ¨ Try yogurt that has live cultures of Lactobacillus. (Check the label.)  ¨ Avoid spicy foods, fruits, alcohol, and caffeine until 48 hours after all symptoms are gone.   ¨ Avoid chewing gum that contains sorbitol. ¨ Avoid dairy products (except for yogurt with Lactobacillus) while you have diarrhea and for 3 days after symptoms are gone. · The doctor may recommend that you take over-the-counter medicine, such as loperamide (Imodium), if you still have diarrhea after 6 hours. Read and follow all instructions on the label. Do not use this medicine if you have bloody diarrhea, a high fever, or other signs of serious illness. Call your doctor if you think you are having a problem with your medicine. When should you call for help? Call 911 anytime you think you may need emergency care. For example, call if:  · You passed out (lost consciousness). · Your stools are maroon or very bloody. Call your doctor now or seek immediate medical care if:  · You are dizzy or lightheaded, or you feel like you may faint. · Your stools are black and look like tar, or they have streaks of blood. · You have new or worse belly pain. · You have symptoms of dehydration, such as:  ¨ Dry eyes and a dry mouth. ¨ Passing only a little dark urine. ¨ Feeling thirstier than usual.  · You have a new or higher fever. Watch closely for changes in your health, and be sure to contact your doctor if:  · Your diarrhea is getting worse. · You see pus in the diarrhea. · You are not getting better after 2 days (48 hours). Where can you learn more? Go to http://roro-kenyatta.info/. Enter X154 in the search box to learn more about \"Diarrhea: Care Instructions. \"  Current as of: March 20, 2017  Content Version: 11.3  © 4642-1926 [employer]. Care instructions adapted under license by [employer] (which disclaims liability or warranty for this information). If you have questions about a medical condition or this instruction, always ask your healthcare professional. [address] any warranty or liability for your use of this information.        Fatigue: Care Instructions  Your Care Instructions  Fatigue is a feeling of tiredness, exhaustion, or lack of energy. You may feel fatigue because of too much or not enough activity. It can also come from stress, lack of sleep, boredom, and poor diet. Many medical problems, such as viral infections, can cause fatigue. Emotional problems, especially depression, are often the cause of fatigue. Fatigue is most often a symptom of another problem. Treatment for fatigue depends on the cause. For example, if you have fatigue because you have a certain health problem, treating this problem also treats your fatigue. If depression or anxiety is the cause, treatment may help. Follow-up care is a key part of your treatment and safety. Be sure to make and go to all appointments, and call your doctor if you are having problems. It's also a good idea to know your test results and keep a list of the medicines you take. How can you care for yourself at home? · Get regular exercise. But don't overdo it. Go back and forth between rest and exercise. · Get plenty of rest.  · Eat a healthy diet. Do not skip meals, especially breakfast.  · Reduce your use of caffeine, tobacco, and alcohol. Caffeine is most often found in coffee, tea, cola drinks, and chocolate. · Limit medicines that can cause fatigue. This includes tranquilizers and cold and allergy medicines. When should you call for help? Watch closely for changes in your health, and be sure to contact your doctor if:  · You have new symptoms such as fever or a rash. · Your fatigue gets worse. · You have been feeling down, depressed, or hopeless. Or you may have lost interest in things that you usually enjoy. · You are not getting better as expected. Where can you learn more? Go to http://roro-kenyatta.info/. Enter E943 in the search box to learn more about \"Fatigue: Care Instructions. \"  Current as of: March 20, 2017  Content Version: 11.3  © 6345-1635 [employer], [facility].  Care instructions adapted under license by [employer] (which disclaims liability or warranty for this information). If you have questions about a medical condition or this instruction, always ask your healthcare professional. [address] any warranty or liability for your use of this information.

## 2017-10-19 NOTE — MR AVS SNAPSHOT
Visit Information Date & Time Provider Department Dept. Phone Encounter #  
 10/19/2017  8:30 AM Sadi Multani, [address] [facility] [phone] [id number] Your Appointments 11/27/2017  1:45 PM  
ESTABLISHED PATIENT with Juan Pablo Saez MD  
[facility]. [place] [address] ([address]) Appt Note: 6MO F/UP STRESS INCONT  
 [address] [place] [address]  
[phone]  
  
   
 [place] [id number] Upcoming Health Maintenance Date Due DTaP/Tdap/Td series (1 - Tdap) 4/30/1994 PAP AKA CERVICAL CYTOLOGY 4/30/1994 INFLUENZA AGE 9 TO ADULT 8/1/2017 Allergies as of 10/19/2017  Review Complete On: 7/25/2017 By: Johnnie Haas NP Severity Noted Reaction Type Reactions Morphine  09/21/2016    Other (comments) GI DISTRESS Sulfa (Sulfonamide Antibiotics)  09/21/2016    Other (comments) GI DISTRESS Sulfamethoxazole-trimethoprim  01/01/2017    Unknown (comments) Vicodin [Hydrocodone-acetaminophen]  09/21/2016    Other (comments) Psychological Reaction Current Immunizations  Never Reviewed No immunizations on file. Not reviewed this visit You Were Diagnosed With   
  
 Codes Comments Fatigue, unspecified type    -  Primary ICD-10-CM: R53.83 ICD-9-CM: 780.79 Diarrhea, unspecified type     ICD-10-CM: R19.7 ICD-9-CM: 787.91 Sleep difficulties     ICD-10-CM: G47.9 ICD-9-CM: 780.50 Vitals BP Pulse Temp Resp Height(growth percentile) Weight(growth percentile) 110/74 76 98 °F (36.7 °C) (Oral) 17 5' 2\" (1.575 m) 220 lb (99.8 kg) SpO2 BMI OB Status Smoking Status 98% 40.24 kg/m2 Hysterectomy Never Smoker BMI and BSA Data Body Mass Index Body Surface Area  
 40.24 kg/m 2 2.09 m 2 Preferred Pharmacy Pharmacy Name Phone [facility] [address] [facility] [phone] Your Updated Medication List  
  
   
This list is accurate as of: 10/19/17  9:08 AM.  Always use your most recent med list.  
  
  
  
  
 albuterol 90 mcg/actuation inhaler Commonly known as:  PROVENTIL HFA, VENTOLIN HFA, PROAIR HFA  
1-2 Puffs. ALPRAZolam 1 mg tablet Commonly known as:  Jorge Clifton Take 1 Tab by mouth as needed. Max Daily Amount: 1 mg.  
  
 ibuprofen 600 mg tablet Commonly known as:  MOTRIN  
600 mg. We Performed the Following REFERRAL TO PULMONARY DISEASE [GUM30 Custom] Comments:  
 Please evaluate for sleep apnea. To-Do List   
 10/19/2017 Lab:  CBC W/O DIFF   
  
 10/19/2017 Lab:  HEPATITIS C AB   
  
 10/19/2017 Lab:  METABOLIC PANEL, BASIC   
  
 10/19/2017 Lab:  MONO SCREEN W/ REFLX EBV   
  
 10/19/2017 Lab:  TSH 3RD GENERATION Referral Information Referral ID Referred By Referred To  
  
 [id number] Lissette Loss Not Available Visits Status Start Date End Date 1 New Request 10/19/17 10/19/18 If your referral has a status of pending review or denied, additional information will be sent to support the outcome of this decision. Patient Instructions Diarrhea: Care Instructions Your Care Instructions Diarrhea is loose, watery stools (bowel movements). The exact cause is often hard to find. Sometimes diarrhea is your body's way of getting rid of what caused an upset stomach. Viruses, food poisoning, and many medicines can cause diarrhea. Some people get diarrhea in response to emotional stress, anxiety, or certain foods. Almost everyone has diarrhea now and then. It usually isn't serious, and your stools will return to normal soon. The important thing to do is replace the fluids you have lost, so you can prevent dehydration. The doctor has checked you carefully, but problems can develop later. If you notice any problems or new symptoms, get medical treatment right away. Follow-up care is a key part of your treatment and safety. Be sure to make and go to all appointments, and call your doctor if you are having problems. It's also a good idea to know your test results and keep a list of the medicines you take. How can you care for yourself at home? · Watch for signs of dehydration, which means your body has lost too much water. Dehydration is a serious condition and should be treated right away. Signs of dehydration are: 
¨ Increasing thirst and dry eyes and mouth. ¨ Feeling faint or lightheaded. ¨ Darker urine, and a smaller amount of urine than normal. 
· To prevent dehydration, drink plenty of fluids, enough so that your urine is light yellow or clear like water. Choose water and other caffeine-free clear liquids until you feel better. If you have kidney, heart, or liver disease and have to limit fluids, talk with your doctor before you increase the amount of fluids you drink. · Begin eating small amounts of mild foods the next day, if you feel like it. ¨ Try yogurt that has live cultures of Lactobacillus. (Check the label.) ¨ Avoid spicy foods, fruits, alcohol, and caffeine until 48 hours after all symptoms are gone. ¨ Avoid chewing gum that contains sorbitol. ¨ Avoid dairy products (except for yogurt with Lactobacillus) while you have diarrhea and for 3 days after symptoms are gone. · The doctor may recommend that you take over-the-counter medicine, such as loperamide (Imodium), if you still have diarrhea after 6 hours. Read and follow all instructions on the label. Do not use this medicine if you have bloody diarrhea, a high fever, or other signs of serious illness. Call your doctor if you think you are having a problem with your medicine. When should you call for help? Call 911 anytime you think you may need emergency care. For example, call if: 
· You passed out (lost consciousness). · Your stools are maroon or very bloody. Call your doctor now or seek immediate medical care if: 
· You are dizzy or lightheaded, or you feel like you may faint. · Your stools are black and look like tar, or they have streaks of blood. · You have new or worse belly pain. · You have symptoms of dehydration, such as: ¨ Dry eyes and a dry mouth. ¨ Passing only a little dark urine. ¨ Feeling thirstier than usual. 
· You have a new or higher fever. Watch closely for changes in your health, and be sure to contact your doctor if: 
· Your diarrhea is getting worse. · You see pus in the diarrhea. · You are not getting better after 2 days (48 hours). Where can you learn more? Go to http://roro-kenyatta.info/. Enter S485 in the search box to learn more about \"Diarrhea: Care Instructions. \" Current as of: March 20, 2017 Content Version: 11.3 © 0858-6108 [employer]. Care instructions adapted under license by Visual[employer] (which disclaims liability or warranty for this information). If you have questions about a medical condition or this instruction, always ask your healthcare professional. [place] [address] any warranty or liability for your use of this information. Fatigue: Care Instructions Your Care Instructions Fatigue is a feeling of tiredness, exhaustion, or lack of energy. You may feel fatigue because of too much or not enough activity. It can also come from stress, lack of sleep, boredom, and poor diet. Many medical problems, such as viral infections, can cause fatigue. Emotional problems, especially depression, are often the cause of fatigue. Fatigue is most often a symptom of another problem. Treatment for fatigue depends on the cause. For example, if you have fatigue because you have a certain health problem, treating this problem also treats your fatigue. If depression or anxiety is the cause, treatment may help. Follow-up care is a key part of your treatment and safety. Be sure to make and go to all appointments, and call your doctor if you are having problems. It's also a good idea to know your test results and keep a list of the medicines you take. How can you care for yourself at home? · Get regular exercise. But don't overdo it. Go back and forth between rest and exercise. · Get plenty of rest. 
· Eat a healthy diet. Do not skip meals, especially breakfast. 
· Reduce your use of caffeine, tobacco, and alcohol. Caffeine is most often found in coffee, tea, cola drinks, and chocolate. · Limit medicines that can cause fatigue. This includes tranquilizers and cold and allergy medicines. When should you call for help? Watch closely for changes in your health, and be sure to contact your doctor if: 
· You have new symptoms such as fever or a rash. · Your fatigue gets worse. · You have been feeling down, depressed, or hopeless. Or you may have lost interest in things that you usually enjoy. · You are not getting better as expected. Where can you learn more? Go to http://roro-kenyatta.info/. Enter J084 in the search box to learn more about \"Fatigue: Care Instructions. \" Current as of: March 20, 2017 Content Version: 11.3 © 4563-4112 [employer], Crestock. Care instructions adapted under license by Bio[employer] (which disclaims liability or warranty for this information). If you have questions about a medical condition or this instruction, always ask your healthcare professional. [place] [address] any warranty or liability for your use of this information. Introducing [facility] & HEALTH SERVICES! Deaconchita Bustos Doing: 
Thank you for requesting a [employer] account. Our records indicate that you already have an active [employer] account. You can access your account anytime at [email or web address] Did you know that you can access your hospital and ER discharge instructions at any time in Rex[employer]? You can also review all of your test results from your hospital stay or ER visit. Additional Information If you have questions, please visit the Frequently Asked Questions section of the [employer] website at [email or web address] Remember, Rex[employer] is NOT to be used for urgent needs. For medical emergencies, dial 911. Now available from your iPhone and Android! Please provide this summary of care documentation to your next provider. Your primary care clinician is listed as Avinash Small. If you have any questions after today's visit, please call [phone].

## 2017-10-19 NOTE — PROGRESS NOTES
HISTORY OF PRESENT ILLNESS  Siva Gloria is a 38 y.o. female who presents with fatigue, diarrhea and left leg discomfort. The fatigue has been present for about three weeks and the diarrhea has been present daily for about two weeks. The fatigue as constant, some days worse than others. Denies fever or chills. Her stool was very loose and frequent on a daily basis for the first week and a half and then became soft but still has two or three BMs a day. After she eats and/or when she has to have a BM she has abdominal cramping. She is concerned about having Hepatitis C because she took her daughter to the ED yesterday and they tested her for it, and has not been informed of the result yet. Patient has not been out of the country or is on any new medications. She did take Flagyl for seven days about three weeks ago. She describes her left lateral thigh discomfort as a mild burning sensation almost constantly, but denies pain or weakness. NKI of the leg. Fatigue   This is a new problem. The current episode started more than 1 week ago. The problem occurs daily. The problem has not changed since onset. Associated symptoms include abdominal pain. Pertinent negatives include no chest pain and no shortness of breath. Nothing aggravates the symptoms. Nothing relieves the symptoms. Diarrhea    The history is provided by the patient. This is a new problem. The current episode started more than 1 week ago. The problem occurs 2 to 4 times per day. There has been no fever. Associated symptoms include abdominal pain. Pertinent negatives include no vomiting, no chills and no myalgias. Risk factors include ill contacts. She has tried nothing for the symptoms. Review of Systems   Constitutional: Positive for fatigue and malaise/fatigue. Negative for chills and fever. Respiratory: Negative for shortness of breath. Cardiovascular: Negative for chest pain and palpitations.    Gastrointestinal: Positive for abdominal pain and diarrhea. Negative for nausea and vomiting. Genitourinary: Negative for dysuria and urgency. Musculoskeletal: Negative for myalgias. Skin: Negative for rash. Visit Vitals    /74    Pulse 76    Temp 98 °F (36.7 °C) (Oral)    Resp 17    Ht 5' 2\" (1.575 m)    Wt 220 lb (99.8 kg)    SpO2 98%    BMI 40.24 kg/m2     Past Medical History:   Diagnosis Date    Microhematuria     Psychiatric disorder     depression/  anxiety    Recurrent UTI     Solitary kidney, acquired     Stress incontinence     Urinary incontinence     Urinary incontinence without sensory awareness      Allergies   Allergen Reactions    Morphine Other (comments)     GI DISTRESS       Sulfa (Sulfonamide Antibiotics) Other (comments)     GI DISTRESS     Sulfamethoxazole-Trimethoprim Unknown (comments)    Vicodin [Hydrocodone-Acetaminophen] Other (comments)     Psychological Reaction        Current Outpatient Prescriptions   Medication Sig    ALPRAZolam (XANAX) 1 mg tablet Take 1 Tab by mouth as needed. Max Daily Amount: 1 mg.  albuterol (PROVENTIL HFA, VENTOLIN HFA, PROAIR HFA) 90 mcg/actuation inhaler 1-2 Puffs.  ibuprofen (MOTRIN) 600 mg tablet 600 mg. No current facility-administered medications for this visit. Physical Exam   Constitutional: She is oriented to person, place, and time. She appears well-developed and well-nourished. No distress. Neck: Neck supple. Cardiovascular: Normal rate, regular rhythm, normal heart sounds and intact distal pulses. No murmur heard. Pulmonary/Chest: Effort normal and breath sounds normal.   Abdominal: Soft. She exhibits no distension. There is tenderness (mildl tenderness over middle and lower mid and left lower abdomen). Lymphadenopathy:     She has no cervical adenopathy. Neurological: She is alert and oriented to person, place, and time. ASSESSMENT and PLAN    ICD-10-CM ICD-9-CM    1.  Fatigue, unspecified type R53.83 780.79 CBC W/O DIFF      METABOLIC PANEL, BASIC      MONO SCREEN W/ REFLX EBV      TSH 3RD GENERATION      URINALYSIS W/ RFLX MICROSCOPIC      CANCELED: AMB POC URINALYSIS DIP STICK AUTO W/O MICRO   2. Diarrhea, unspecified type R19.7 787.91 HEPATITIS C AB      OCCULT BLOOD, STOOL      OVA & PARASITES, STOOL      C. DIFFICILE/EPI PCR   3. Sleep difficulties G47.9 780.50 REFERRAL TO PULMONARY DISEASE     Labs drawn for evaluation of fatigue and she is instructed to bring back a stool sample for testing. She is given a referral to pulmonary as requested for evaluation of sleep apnea. She is instructed to go to the ED if symptoms worsen. Patient verbalizes understanding and agrees with plan.

## 2017-10-20 LAB
EBV EA IGG SER-ACNC: 25.5 U/ML (ref 0–8.9)
EBV NA IGG SER-ACNC: 400 U/ML (ref 0–17.9)
EBV VCA IGG SER-ACNC: 287 U/ML (ref 0–17.9)
EBV VCA IGM SER-ACNC: <36 U/ML (ref 0–35.9)
HCV AB SER IA-ACNC: 0.08 INDEX
HCV AB SERPL QL IA: NEGATIVE
HCV COMMENT,HCGAC: NORMAL
HETEROPH AB SER QL: NEGATIVE
INTERPRETATION, 169995: ABNORMAL

## 2017-10-21 LAB — HEMOCCULT STL QL IA: NEGATIVE

## 2017-10-23 LAB
O+P SPEC MICRO: NORMAL
O+P STL CONC: NORMAL
SPECIMEN SOURCE: NORMAL

## 2017-12-05 ENCOUNTER — TELEPHONE (OUTPATIENT)
Dept: FAMILY MEDICINE CLINIC | Age: 42
End: 2017-12-05

## 2017-12-05 NOTE — TELEPHONE ENCOUNTER
----- Message from Pinky Flores sent at 12/5/2017 11:35 AM EST -----  Please call pt for condition update for fatigue since mono test was neg.   Also condition update regarding her GI sxs; if still present then she needs to f/u with GI.

## 2017-12-05 NOTE — PROGRESS NOTES
Please call pt for condition update for fatigue since mono test was neg.   Also condition update regarding her GI sxs; if still present then she needs to f/u with GI.

## 2017-12-05 NOTE — LETTER
12/8/2017 3:57 PM 
 
Ms. Mary Issa [address] [place] [address] Dear Ms. Vanesa: We have been trying to reach you by telephone regarding your results. Please call our office at [phone] to speak with one of the nurses. Thank you. Sincerely, GUILLE Horn

## 2018-01-18 ENCOUNTER — TELEPHONE (OUTPATIENT)
Dept: INTERNAL MEDICINE CLINIC | Age: 43
End: 2018-01-18

## 2018-01-18 NOTE — TELEPHONE ENCOUNTER
Pt was scheduled to see me for weight loss, was discharged from [address] [place] [address] program 8/22/2017 for non compliance and outstanding bill. PSR called pt to inform can re-enroll in program or discuss weight loss with PCP. Pt upset and hung up after being informed.

## 2018-03-02 ENCOUNTER — OFFICE VISIT (OUTPATIENT)
Dept: FAMILY MEDICINE CLINIC | Age: 43
End: 2018-03-02

## 2018-03-02 ENCOUNTER — HOSPITAL ENCOUNTER (OUTPATIENT)
Dept: LAB | Age: 43
Discharge: HOME | End: 2018-03-02
Payer: COMMERCIAL

## 2018-03-02 VITALS
RESPIRATION RATE: 18 BRPM | HEIGHT: 62 IN | SYSTOLIC BLOOD PRESSURE: 120 MMHG | TEMPERATURE: 96.2 F | HEART RATE: 74 BPM | DIASTOLIC BLOOD PRESSURE: 69 MMHG | WEIGHT: 236.8 LBS | OXYGEN SATURATION: 97 % | BODY MASS INDEX: 43.58 KG/M2

## 2018-03-02 DIAGNOSIS — K21.9 CHRONIC GERD: ICD-10-CM

## 2018-03-02 DIAGNOSIS — E66.09 OBESITY DUE TO EXCESS CALORIES, UNSPECIFIED CLASSIFICATION, UNSPECIFIED WHETHER SERIOUS COMORBIDITY PRESENT: ICD-10-CM

## 2018-03-02 DIAGNOSIS — E66.09 OBESITY DUE TO EXCESS CALORIES, UNSPECIFIED CLASSIFICATION, UNSPECIFIED WHETHER SERIOUS COMORBIDITY PRESENT: Primary | ICD-10-CM

## 2018-03-02 LAB
ANION GAP SERPL CALC-SCNC: 6 MMOL/L (ref 3–18)
BUN SERPL-MCNC: 13 MG/DL (ref 7–18)
BUN/CREAT SERPL: 17 (ref 12–20)
CALCIUM SERPL-MCNC: 8.4 MG/DL (ref 8.5–10.1)
CHLORIDE SERPL-SCNC: 106 MMOL/L (ref 100–108)
CO2 SERPL-SCNC: 28 MMOL/L (ref 21–32)
CREAT SERPL-MCNC: 0.78 MG/DL (ref 0.6–1.3)
GLUCOSE SERPL-MCNC: 95 MG/DL (ref 74–99)
POTASSIUM SERPL-SCNC: 3.9 MMOL/L (ref 3.5–5.5)
SODIUM SERPL-SCNC: 140 MMOL/L (ref 136–145)

## 2018-03-02 PROCEDURE — 80048 BASIC METABOLIC PNL TOTAL CA: CPT | Performed by: FAMILY MEDICINE

## 2018-03-02 PROCEDURE — 36415 COLL VENOUS BLD VENIPUNCTURE: CPT | Performed by: FAMILY MEDICINE

## 2018-03-02 RX ORDER — PHENOL/SODIUM PHENOLATE
20 AEROSOL, SPRAY (ML) MUCOUS MEMBRANE DAILY
Qty: 90 TAB | Refills: 1 | Status: SHIPPED | OUTPATIENT
Start: 2018-03-02 | End: 2018-03-09

## 2018-03-02 NOTE — PROGRESS NOTES
Crow Rick is a 38 y.o.  female and presents with F/U for solitary kidney, obesity and GERD. Patient would like to consider   Bariatric surgery as possible weight loss option. Subjective: Additional Concerns: none    Patient Active Problem List    Diagnosis Date Noted    Morbid obesity with BMI of 40.0-44.9, adult ([facility] [address] 2017    Anxiety 2017    Urinary incontinence without sensory awareness     Solitary kidney, acquired     Recurrent UTI     Psychiatric disorder     Microhematuria     Stress incontinence      Current Outpatient Prescriptions   Medication Sig Dispense Refill    ALPRAZolam (XANAX) 1 mg tablet Take 1 Tab by mouth as needed. Max Daily Amount: 1 mg. 30 Tab 2     Allergies   Allergen Reactions    Morphine Other (comments)     GI DISTRESS       Sulfa (Sulfonamide Antibiotics) Other (comments)     GI DISTRESS     Sulfamethoxazole-Trimethoprim Unknown (comments)    Vicodin [Hydrocodone-Acetaminophen] Other (comments)     Psychological Reaction        Past Medical History:   Diagnosis Date    Dysfunctional voiding of urine     Incomplete bladder emptying     Microhematuria     Psychiatric disorder     depression/  anxiety    Recurrent UTI     Solitary kidney, acquired     Stress incontinence     Urinary incontinence     Urinary incontinence without sensory awareness      Past Surgical History:   Procedure Laterality Date    HX  SECTION      HX HYSTERECTOMY  2012    partial hysterectomy 2/2 painful menses    HX NEPHRECTOMY Left 2000    pt. has right kidney    .  HX OTHER SURGICAL  2013    Cholecystectomy    HX UROLOGICAL  2016    CYSTOSCOPY.       Family History   Problem Relation Age of Onset    Elevated Lipids Mother     Hypertension Mother     No Known Problems Father      Social History   Substance Use Topics    Smoking status: Never Smoker    Smokeless tobacco: Never Used    Alcohol use 0.6 oz/week     1 Shots of liquor per week      Comment:  rarely drink   Q 3 mos.      ROS     General: negative for - chills, fatigue, fever, weight change  Endo: negative for - hot flashes, polydipsia/polyuria or temperature intolerance  Resp: negative for - cough, shortness of breath or wheezing  CV: negative for - chest pain, edema or palpitations  MSK: negative for - joint pain, joint swelling or muscle pain  Neuro: negative for - confusion, headaches, seizures or weakness    Objective:    Alert, well appearing, and in no distress, oriented to person, place, and time and overweight  General appearance - alert, well appearing, and in no distress, oriented to person, place, and time and overweight  Mental status - alert, oriented to person, place, and time, normal mood, behavior, speech, dress, motor activity, and thought processes  Chest - clear to auscultation, no wheezes, rales or rhonchi, symmetric air entry  Heart - normal rate, regular rhythm, normal S1, S2, no murmurs, rubs, clicks or gallops  Extremities - peripheral pulses normal, no pedal edema, no clubbing or cyanosis    LABS   Office Visit on 02/19/2018   Component Date Value Ref Range Status    FINAL REPORT 02/19/2018 Microbiology results*  Final    Comment: ANTIBIOTIC ALLERGIES  Sulfa (Bactrim), Trimethoprim    URINE SOURCE:  Straight Cath In/Out    COLONY COUNT / RESULT:   Greater than 100,000 cfu/ml    RESULT/ORG ID:  E. coli (Isolate 1)    Sensitivity Analysis                           Isolate 1  --------------------                           ---------    AMIKACIN                                               S  AMPICILLIN                                             R  AUGMENTIN                                              S  BACTRIM (SULFA/TRIMETHPRM)                             S  CEFAZOLIN (ANCEF)                                      R  CEFTAZIDIME                                            S  CIPRO                                                  S  GENTAMICIN R  LEVAQUIN                                               S  MACRODANTN (NITROFURANTOIN                             S  ROCEPHIN (CEFTRIAXONE)                                 S  TETRACYCLINE                                                                      R  TOBRAMYCIN                                             I  ZOSYN (TAZOBACTAM/PIP)                                 S    S=Sensitive; I=Intermediate; R=Resistant      Color (UA POC) 02/19/2018 Yellow   Final    Clarity (UA POC) 02/19/2018 Cloudy   Final    Glucose (UA POC) 02/19/2018 Negative  Negative Final    Bilirubin (UA POC) 02/19/2018 Negative  Negative Final    Ketones (UA POC) 02/19/2018 Negative  Negative Final    Specific gravity (UA POC) 02/19/2018 1.020  1.001 - 1.035 Final    Blood (UA POC) 02/19/2018 3+  Negative Final    moderate    pH (UA POC) 02/19/2018 5.0  4.6 - 8.0 Final    Protein (UA POC) 02/19/2018 Negative  Negative Final    Urobilinogen (UA POC) 02/19/2018 0.2 mg/dL  0.2 - 1 Final    Nitrites (UA POC) 02/19/2018 Positive  Negative Final    Leukocyte esterase (UA POC) 02/19/2018 2+  Negative Final   Office Visit on 02/03/2018   Component Date Value Ref Range Status    Color (UA POC) 02/03/2018 Yellow   Final    Clarity (UA POC) 02/03/2018 Clear   Final    Glucose (UA POC) 02/03/2018 Negative  Negative Final    Bilirubin (UA POC) 02/03/2018 Negative  Negative Final    Ketones (UA POC) 02/03/2018 Negative  Negative Final    Specific gravity (UA POC) 02/03/2018 1.020  1.001 - 1.035 Final    Blood (UA POC) 02/03/2018 3+  Negative Final    pH (UA POC) 02/03/2018 5.0  4.6 - 8.0 Final    Protein (UA POC) 02/03/2018 Negative  Negative Final    Urobilinogen (UA POC) 02/03/2018 0.2 mg/dL  0.2 - 1 Final    Nitrites (UA POC) 02/03/2018 Negative  Negative Final    Leukocyte esterase (UA POC) 02/03/2018 Trace  Negative Final    FINAL REPORT 02/03/2018 Microbiology results   Final    Comment: ANTIBIOTIC ALLERGIES  Sulfa (Bactrim)    URINE SOURCE:  Clean Catch    COLONY COUNT / RESULT:   10,000 cfu/ml Mixed Urogenital / [facility] Outpatient Visit on 10/19/2017   Component Date Value Ref Range Status    Sodium 10/19/2017 140  136 - 145 mmol/L Final    Potassium 10/19/2017 4.0  3.5 - 5.5 mmol/L Final    Chloride 10/19/2017 106  100 - 108 mmol/L Final    CO2 10/19/2017 27  21 - 32 mmol/L Final    Anion gap 10/19/2017 7  3.0 - 18 mmol/L Final    Glucose 10/19/2017 87  74 - 99 mg/dL Final    BUN 10/19/2017 13  7.0 - 18 MG/DL Final    Creatinine 10/19/2017 0.90  0.6 - 1.3 MG/DL Final    BUN/Creatinine ratio 10/19/2017 14  12 - 20   Final    GFR est AA 10/19/2017 >60  >60 ml/min/1.73m2 Final    GFR est non-AA 10/19/2017 >60  >60 ml/min/1.73m2 Final    Comment: (NOTE)  Estimated GFR is calculated using the Modification of Diet in Renal   Disease (MDRD) Study equation, reported for both  Americans   (GFRAA) and non- Americans (GFRNA), and normalized to 1.73m2   body surface area. The physician must decide which value applies to   the patient. The MDRD study equation should only be used in   individuals age 25 or older. It has not been validated for the   following: pregnant women, patients with serious comorbid conditions,   or on certain medications, or persons with extremes of body size,   muscle mass, or nutritional status.  Calcium 10/19/2017 8.6  8.5 - 10.1 MG/DL Final    Hepatitis C virus Ab 10/19/2017 0.08  <0.80 Index Final    Hep C  virus Ab Interp. 10/19/2017 NEGATIVE   NEG   Final    Hep C  virus Ab comment 10/19/2017        Final    Comment: Index <0.80. ......................... [place] Jones Negative  Index > or = to 0.80 and <1.00. .... [place] Jones Equivocal  Index >1.00. ......................... [place] Jones Positive          For Equivocal or Positive results, confirmation with Hepatitis C RNA by PCR or bDNA is suggested.       Mononucleosis screen w reflex EBV 10/19/2017 NEGATIVE   NEG Final    TSH 10/19/2017 1.70  0.36 - 3.74 uIU/mL Final    WBC 10/19/2017 9.3  4.6 - 13.2 K/uL Final    RBC 10/19/2017 4.92  4.20 - 5.30 M/uL Final    HGB 10/19/2017 14.5  12.0 - 16.0 g/dL Final    HCT 10/19/2017 44.4  35.0 - 45.0 % Final    MCV 10/19/2017 90.2  74.0 - 97.0 FL Final    MCH 10/19/2017 29.5  24.0 - 34.0 PG Final    MCHC 10/19/2017 32.7  31.0 - 37.0 g/dL Final    RDW 10/19/2017 13.6  11.6 - 14.5 % Final    PLATELET 57/99/7979 713  135 - 420 K/uL Final    MPV 10/19/2017 9.8  9.2 - 11.8 FL Final    Source 10/19/2017 STOOL    Final    Ova & Parasite exam 10/19/2017 Final Report Below   Final    Comment: (NOTE)  These results were obtained using wet preparation(s) and trichrome  stained smear. This test does not include testing for  Cryptosporidium parvum, Cyclospora, or Microsporidia. Performed At: [address] [id number]  Gemma Thompson MD XI:[id number]      Special Requests: 10/19/2017 NO SPECIAL REQUESTS    Final    Culture result: 10/19/2017 Toxigenic C. difficile NEGATIVE                         C. difficile target DNA sequences are not detected.     Final    Color 10/19/2017 YELLOW    Final    Appearance 10/19/2017 CLEAR    Final    Specific gravity 10/19/2017 1.020  1.005 - 1.030   Final    pH (UA) 10/19/2017 5.0  5.0 - 8.0   Final    Protein 10/19/2017 NEGATIVE   NEG mg/dL Final    Glucose 10/19/2017 NEGATIVE   NEG mg/dL Final    Ketone 10/19/2017 NEGATIVE   NEG mg/dL Final    Bilirubin 10/19/2017 NEGATIVE   NEG   Final    Blood 10/19/2017 TRACE* NEG   Final    Urobilinogen 10/19/2017 0.2  0.2 - 1.0 EU/dL Final    Nitrites 10/19/2017 NEGATIVE   NEG   Final    Leukocyte Esterase 10/19/2017 TRACE* NEG   Final    Occult blood fecal, by IA 10/19/2017 NEGATIVE   NEGATIVE   Final    Comment: (NOTE)  Performed At: [address] [id number]  Maikol Flanagan MD DZ:[id number]      WBC 10/19/2017 6 to 8  0 - 4 /hpf Final    RBC 10/19/2017 0 to 2  0 - 5 /hpf Final    Epithelial cells 10/19/2017 1+  0 - 5 /lpf Final    Bacteria 10/19/2017 NEGATIVE   NEG /hpf Final    EBV Ab VCA, IgM 10/19/2017 <36.0  0.0 - 35.9 U/mL Final    Comment: (NOTE)                                  Negative        <36.0                                  Equivocal 36.0 - 43.9                                  Positive        >43.9      EBV Early Antigen Ab, IgG 10/19/2017 25.5* 0.0 - 8.9 U/mL Final    Comment: (NOTE)                                  Negative        < 9.0                                  Equivocal  9.0 - 10.9                                  Positive        >10.9      EBV Ab VCA, IgG 10/19/2017 287.0* 0.0 - 17.9 U/mL Final    Comment: (NOTE)                                  Negative        <18.0                                  Equivocal 18.0 - 21.9                                  Positive        >21.9      EBV Nuclear Antigen Ab, IgG 10/19/2017 400.0* 0.0 - 17.9 U/mL Final    Comment: (NOTE)                                  Negative        <18.0                                  Equivocal 18.0 - 21.9                                  Positive        >21.9      Interpretation 10/19/2017 Comment    Final    Comment: (NOTE)                EBV Interpretation Chart  Interpretation   EBV-IgM  EA(D)-IgG  VCA-IgG  EBNA-IgG  EBV Seronegative    -        -         -          -  Early Phase         +        -         -          -  Acute Primary       +       +or-       +          -  Infection  Convalescence/Past  -       +or-       +          +  Infection  Reactivated        +or-      +         +          +  Infection        + Antibody Present      - Antibody Absent  Performed At: [address] [id number]  Yaron Ramos MD CAROLYN:[id number]      Result 1 10/19/2017 Comment    Final    Comment: (NOTE)  No ova, cysts, or parasites seen.   One negative specimen does not rule out the possibility of a  parasitic infection. Performed At: [facility]  [place] [address] [id number]  Rodri Guthrie MD O         TESTS  Results for orders placed or performed in visit on 18   URINE C&S   Result Value Ref Range    FINAL REPORT Microbiology results (A)    AMB POC URINALYSIS DIP STICK AUTO W/O MICRO   Result Value Ref Range    Color (UA POC) Yellow     Clarity (UA POC) Cloudy     Glucose (UA POC) Negative Negative    Bilirubin (UA POC) Negative Negative    Ketones (UA POC) Negative Negative    Specific gravity (UA POC) 1.020 1.001 - 1.035    Blood (UA POC) 3+ Negative    pH (UA POC) 5.0 4.6 - 8.0    Protein (UA POC) Negative Negative    Urobilinogen (UA POC) 0.2 mg/dL 0.2 - 1    Nitrites (UA POC) Positive Negative    Leukocyte esterase (UA POC) 2+ Negative     Assessment/Plan:      1. Solitary kidney - CMP    2. Morbid obesity - referral to Bariatric surgery     3. GERD stable - Refilled/restart Prilosec since patient is out. Lab review: no lab studies available for review at time of visit. We will call patient for results and further plan. I have discussed the diagnosis with the patient and the intended plan as seen in the above orders. The patient has received an after-visit summary and questions were answered concerning future plans. I have discussed medication side effects and warnings with the patient as well. I have reviewed the plan of care with the patient, accepted their input and they are in agreement with the treatment goals. F/U in 3-6 months.      Floresita Mcpherson MD

## 2018-03-02 NOTE — PROGRESS NOTES
Cristy Alford is a 38 y.o. female presents to office for weight loss        Health Maintenance items with a due date reviewed with patient:  Health Maintenance Due   Topic Date Due    DTaP/Tdap/Td series (1 - Tdap) 04/30/1994    PAP AKA CERVICAL CYTOLOGY  04/30/1994    Influenza Age 9 to Adult  08/01/2017

## 2018-03-03 NOTE — PATIENT INSTRUCTIONS
Gastroesophageal Reflux Disease (GERD): Care Instructions  Your Care Instructions    Gastroesophageal reflux disease (GERD) is the backward flow of stomach acid into the esophagus. The esophagus is the tube that leads from your throat to your stomach. A one-way valve prevents the stomach acid from moving up into this tube. When you have GERD, this valve does not close tightly enough. If you have mild GERD symptoms including heartburn, you may be able to control the problem with antacids or over-the-counter medicine. Changing your diet, losing weight, and making other lifestyle changes can also help reduce symptoms. Follow-up care is a key part of your treatment and safety. Be sure to make and go to all appointments, and call your doctor if you are having problems. It's also a good idea to know your test results and keep a list of the medicines you take. How can you care for yourself at home? · Take your medicines exactly as prescribed. Call your doctor if you think you are having a problem with your medicine. · Your doctor may recommend over-the-counter medicine. For mild or occasional indigestion, antacids, such as Tums, Gaviscon, Mylanta, or Maalox, may help. Your doctor also may recommend over-the-counter acid reducers, such as Pepcid AC, Tagamet HB, Zantac 75, or Prilosec. Read and follow all instructions on the label. If you use these medicines often, talk with your doctor. · Change your eating habits. ¨ It's best to eat several small meals instead of two or three large meals. ¨ After you eat, wait 2 to 3 hours before you lie down. ¨ Chocolate, mint, and alcohol can make GERD worse. ¨ Spicy foods, foods that have a lot of acid (like tomatoes and oranges), and coffee can make GERD symptoms worse in some people. If your symptoms are worse after you eat a certain food, you may want to stop eating that food to see if your symptoms get better.   · Do not smoke or chew tobacco. Smoking can make GERD worse. If you need help quitting, talk to your doctor about stop-smoking programs and medicines. These can increase your chances of quitting for good. · If you have GERD symptoms at night, raise the head of your bed 6 to 8 inches by putting the frame on blocks or placing a foam wedge under the head of your mattress. (Adding extra pillows does not work.)  · Do not wear tight clothing around your middle. · Lose weight if you need to. Losing just 5 to 10 pounds can help. When should you call for help? Call your doctor now or seek immediate medical care if:  ? · You have new or different belly pain. ? · Your stools are black and tarlike or have streaks of blood. ? Watch closely for changes in your health, and be sure to contact your doctor if:  ? · Your symptoms have not improved after 2 days. ? · Food seems to catch in your throat or chest.   Where can you learn more? Go to http://roro-kenyatta.info/. Enter N006 in the search box to learn more about \"Gastroesophageal Reflux Disease (GERD): Care Instructions. \"  Current as of: May 12, 2017  Content Version: 11.4  © 5408-4096 [employer]. Care instructions adapted under license by [employer] (which disclaims liability or warranty for this information). If you have questions about a medical condition or this instruction, always ask your healthcare professional. [address] any warranty or liability for your use of this information. Learning About Obesity  What is obesity? Obesity means having so much body fat that your health is in danger. Having too much body fat can lead to type 2 diabetes, heart disease, high blood pressure, arthritis, sleep apnea, and stroke. Even if you don't feel bad now, think about these health risks. Do they seem like a good reason to start on a new path toward a healthier weight? Or do you have another personal, powerful reason for wanting to lose weight? Whatever it is, keep it in mind. It can be hard to change eating habits and exercise habits. But with your own reason and plan, you can do it. How do you know if your weight is in the obesity range? To know if your weight is in the obesity range, your doctor looks at your body mass index (BMI) and waist size. Your BMI is a number that is calculated from your weight and your height. To figure your BMI for yourself, get a BMI table from your doctor or use an online tool, such as http://www.pearce.com/ on the [employer] of [employer]. What causes obesity? When you take in more calories than you burn off, you gain weight. How you eat, how active you are, and other things affect how your body uses calories and whether you gain weight. If you have family members who have too much body fat, you may have inherited a tendency to gain weight. And your family also helps form your eating and lifestyle habits, which can lead to obesity. Also, our busy lives make it harder to plan and cook healthy meals. For many of us, it's easier to reach for prepared foods, go out to eat, or go to the drive-through. But these foods are often high in saturated fat and calories. Portions are often too large. What can you do to reach a healthy weight? Focus on health, not diets. Diets are hard to stay on and don't work in the long run. It is very hard to stay with a diet that includes lots of big changes in your eating habits. Instead of a diet, focus on lifestyle changes that will improve your health and achieve the right balance of energy and calories. To lose weight, you need to burn more calories than you take in. You can do it by eating healthy foods in reasonable amounts and becoming more active, even a little bit every day. Making small changes over time can add up to a lot. Make a plan for change.  Many people have found that naming their reasons for change and staying focused on their plan can make a big difference. Work with your doctor to create a plan that is right for you. · Ask yourself: Cleta Rings are my personal, most powerful reasons for wanting this change? What will my life look like when I've made the change? \"  · Set your long-term goal. Make it specific, such as \"I will lose x pounds. \"  · Break your long-term goal into smaller, short-term goals. Make these small steps specific and within your reach, things you know you can do. These steps are what keep you going from day to day. How can you stay on your plan for change? Be ready. Choose to start during a time when there are few events that might trigger slip-ups, like holidays, social events, and high-stress periods. Decide on your first few steps. Most people have more success when they make small changes, one step at a time. For example, you might switch a daily candy bar to a piece of fruit, walk 10 minutes more, or add more vegetables to a meal.  Line up your support people. Make sure you're not going to be alone as you make this change. Connect with people who understand how important it is to you. Ask family members and friends for help in keeping with your plan. And think about who could make it harder for you, and how to handle them. Try tracking. People who keep track of what they eat, feel, and do are better at losing weight. Try writing down things like:  · What and how much you eat. · How you feel before and after each meal.  · Details about each meal (like eating out or at home, eating alone, or with friends or family). · What you do to be active. Look and plan. As you track, look for patterns that you may want to change. Take note of:  · When you eat and whether you skip meals. · How often you eat out. · How many fruits and vegetables you eat. · When you eat beyond feeling full. · When and why you eat for reasons other than being hungry. When you stray from your plan, don't get upset.  Figure out what made you slip up and how you can fix it. Can you take medicines or have surgery to lose weight? Before your doctor will prescribe medicines or surgery, he or she will probably want you to be more active and follow your healthy eating plan for a period of time. These habits are key lifelong changes for managing your weight, with or without other medical treatment. And these changes can help you avoid weight-related health problems. Follow-up care is a key part of your treatment and safety. Be sure to make and go to all appointments, and call your doctor if you are having problems. It's also a good idea to know your test results and keep a list of the medicines you take. Where can you learn more? Go to http://roro-kenyatta.info/. Enter N111 in the search box to learn more about \"Learning About Obesity. \"  Current as of: October 13, 2016  Content Version: 11.4  © 0191-0780 [employer]. Care instructions adapted under license by [employer] (which disclaims liability or warranty for this information). If you have questions about a medical condition or this instruction, always ask your healthcare professional. [address] any warranty or liability for your use of this information.

## 2018-03-05 ENCOUNTER — TELEPHONE (OUTPATIENT)
Dept: FAMILY MEDICINE CLINIC | Age: 43
End: 2018-03-05

## 2018-03-05 NOTE — TELEPHONE ENCOUNTER
----- Message from Sharron Shaikh MD sent at 3/3/2018 10:34 AM EST -----  Pls report normal lab.  F/U is routine

## 2018-03-05 NOTE — LETTER
3/5/2018 2:29 PM 
 
Ms. Rosales Kapoor [address] [address] Dear Rosales Kapoor I have reviewed your results and have found the results listed below to be within normal ranges. CBC and CMP My recommendations are as follows: 
Routine Follow up Please call if you have any questions [phone] . Sincerely, Leanne Rowan MD

## 2018-03-09 ENCOUNTER — TELEPHONE (OUTPATIENT)
Dept: FAMILY MEDICINE CLINIC | Age: 43
End: 2018-03-09

## 2018-03-14 ENCOUNTER — OFFICE VISIT (OUTPATIENT)
Dept: SURGERY | Age: 43
End: 2018-03-14

## 2018-03-14 VITALS
DIASTOLIC BLOOD PRESSURE: 76 MMHG | BODY MASS INDEX: 43.61 KG/M2 | HEART RATE: 68 BPM | RESPIRATION RATE: 20 BRPM | SYSTOLIC BLOOD PRESSURE: 122 MMHG | WEIGHT: 237 LBS | TEMPERATURE: 96.4 F | HEIGHT: 62 IN

## 2018-03-14 DIAGNOSIS — Z99.89 OSA ON CPAP: ICD-10-CM

## 2018-03-14 DIAGNOSIS — E66.01 MORBID OBESITY (HCC): Primary | ICD-10-CM

## 2018-03-14 DIAGNOSIS — F41.9 ANXIETY: ICD-10-CM

## 2018-03-14 DIAGNOSIS — N39.3 STRESS INCONTINENCE: ICD-10-CM

## 2018-03-14 DIAGNOSIS — G47.33 OSA ON CPAP: ICD-10-CM

## 2018-03-14 NOTE — PROGRESS NOTES
Mandy Tom is a 38 y.o. female who presents today with   Chief Complaint   Patient presents with    Morbid Obesity     consult     Body mass index is 43.35 kg/(m^2). 1. Have you been to the ER, urgent care clinic since your last visit? Hospitalized since your last visit? No    2. Have you seen or consulted any other health care providers outside of the [address] [place] [address] since your last visit? Include any pap smears or colon screening.  No

## 2018-03-14 NOTE — COMMUNICATION BODY
Initial Consultation for Bariatric Surgery Template (Gastric Bypass)    Marcela Palafox is a 38 y.o. female who comes into the office today for initial consultation for the surgical options for the treatment of morbid obesity. The patient initially identified obesity at the age of 27 and at age 23 weighed 128lbs. She has tried a variety of unsupervised weight-loss attempts including Weight Watchers and family physician, but has yet to meet with lasting success. Maximum weight lost on a diet is about 15 lbs, but that the weight loss always seems to return. Today, the patient is  Height: 5' 2\" (157.5 cm) tall, Weight: 107.5 kg (237 lb) lbs for a Body mass index is 43.35 kg/(m^2). It is due to the patient's severe obesity, which is further complicated by stress urinary incontinence and obstructive sleep apnea - CPAP  that the patient is now seeking out bariatric surgery, specifically, gastric bypass. Past Medical History:   Diagnosis Date    Anxiety     Dysfunctional voiding of urine     Microhematuria     Pelvic pain     Psychiatric disorder     depression/  anxiety    Recurrent UTI     Solitary kidney, acquired     Stress incontinence        Past Surgical History:   Procedure Laterality Date    HX  SECTION      HX HYSTERECTOMY  2012    partial hysterectomy /2 painful menses    HX NEPHRECTOMY Left 2000    pt. has right kidney    .  HX OTHER SURGICAL  2013    Cholecystectomy    HX UROLOGICAL  2016    CYSTOSCOPY.     HX UROLOGICAL      Interstem stimulation device       Current Outpatient Prescriptions   Medication Sig Dispense Refill    acetaminophen-codeine (TYLENOL #3) 300-30 mg per tablet       ciprofloxacin HCl (CIPRO) 250 mg tablet Take 1 Tab by mouth two (2) times a day for 5 days. 10 Tab 0    ALPRAZolam (XANAX) 1 mg tablet Take 1 Tab by mouth as needed.  Max Daily Amount: 1 mg. 30 Tab 2       Allergies   Allergen Reactions    Ibuprofen Other (comments) Patient has 1 kidney and can not take Ibuprofen    Morphine Other (comments)     GI DISTRESS       Sulfa (Sulfonamide Antibiotics) Other (comments)     GI DISTRESS     Sulfamethoxazole-Trimethoprim Unknown (comments)    Vicodin [Hydrocodone-Acetaminophen] Other (comments)     Psychological Reaction          Social History   Substance Use Topics    Smoking status: Never Smoker    Smokeless tobacco: Never Used    Alcohol use 0.6 oz/week     1 Shots of liquor per week      Comment:  rarely drink   Q 3 mos. Family History   Problem Relation Age of Onset    Elevated Lipids Mother     Hypertension Mother     No Known Problems Father        Family Status   Relation Status    Mother Alive    Father        Review of Systems:  Positive in BOLD    CONST: Fever, weight loss, fatigue or chills  GI: Nausea, vomiting, abdominal pain, change in bowel habits, hematochezia, melena, and GERD   INTEG: Dermatitis, abnormal moles  HEENT: Recent changes in vision, vertigo, epistaxis, dysphagia and hoarseness  CV: Chest pain, palpitations, HTN, edema and varicosities  RESP: Cough, shortness of breath, wheezing, hemoptysis, snoring and reactive airway disease  : Hematuria, dysuria, frequency, urgency, nocturia and stress urinary incontinence   MS: Weakness, joint pain and arthritis  ENDO: Diabetes, thyroid disease, polyuria, polydipsia, polyphagia, poor wound healing, heat intolerance, cold intolerance  LYMPH/HEME: Anemia, bruising and history of blood transfusions  NEURO: Dizziness, headache, fainting, seizures and stroke  PSYCH: Anxiety and depression      Physical Exam    Visit Vitals    /76 (BP 1 Location: Right arm, BP Patient Position: At rest)    Pulse 68    Temp 96.4 °F (35.8 °C) (Oral)    Resp 20    Ht 5' 2\" (1.575 m)    Wt 107.5 kg (237 lb)    BMI 43.35 kg/m2       Pre op weight: 237  EBW: 112  Wt loss to date: 0       General: 38 y.o.) female in no acute distress.  Morbidly obese in abdomen - mixed pattern  HEENT: Normocephalic, atraumatic, Pupils equal and reactive, nasopharynx clear, oropharynx clear and moist without lesions  NECK: Supple, no lymphadenopathy, thyromegaly, carotid bruits or jugular venous distension. trachea midline  RESP: Clear to auscultation bilaterally, no wheezes, rhonchi, or rales, normal respiratory excursion  CV: Regular rate and rhythm, no murmurs, rubs or gallops. 3+/4 pulses in bilateral dorsalis pedis and posterior tibialis. No distal edema or varicosities. ABD: Soft, nontender, nondistended, normoactive bowel sounds, no hernias, no hepatosplenomegaly, easily palpable costal margins, mixed distribution, healed midline and pfannenstiel incision  Extremities: Warm, well perfused, no tenderness or swelling, normal gait/station  Neuro: Sensation and strength grossly intact and symmetrical  Psych: Alert and oriented to person, place, and time. Impression:    Shyla Rosario is a 38 y.o. female who is suffering from morbid obesity with a BMI of 43  and comorbidities including stress urinary incontinence and obstructive sleep apnea - CPAP  who would benefit from bariatric surgery. We have had an extensive discussion with regard to the risks, benefits and likely outcomes of the operation. We've discussed the restrictive and malabsorptive nature of the gastric bypass and compared and contrasted with the sleeve gastrectomy. The patient understands the likelihood of losing approximately 80% of their excess weight in 12 to 18 months. She also understands the risks including but not limited to bleeding, infection, need for reoperation, ulcers, leaks and strictures, bowel obstruction secondary to adhesions and internal hernias, DVT, PE, heart attack, stroke, and death. Patient also understands risks of inadequate weight loss, excess weight loss, vitamin insufficiency, protein malnutrition, excess skin, and loss of hair.   We have reviewed the components of a successful postoperative course including requirement for a high protein, low carbohydrate diet, 60 oz a day of zero calorie liquids, daily vitamin supplementation, daily exercise, regular follow-up, and participation in support groups. At this time we will enroll the patient in our bariatric program, undertake routine laboratory evaluation, chest X-ray, EKG, possible UGI and evaluation by  nutritionist as well as psychologist and pending their satisfactory completion of the preop evaluation, plan to perform a gastric bypass.

## 2018-03-14 NOTE — PROGRESS NOTES
Initial Consultation for Bariatric Surgery Template (Gastric Bypass)    Kiran Weston is a 38 y.o. female who comes into the office today for initial consultation for the surgical options for the treatment of morbid obesity. The patient initially identified obesity at the age of 27 and at age 23 weighed 128lbs. She has tried a variety of unsupervised weight-loss attempts including Weight Watchers and family physician, but has yet to meet with lasting success. Maximum weight lost on a diet is about 15 lbs, but that the weight loss always seems to return. Today, the patient is  Height: 5' 2\" (157.5 cm) tall, Weight: 107.5 kg (237 lb) lbs for a Body mass index is 43.35 kg/(m^2). It is due to the patient's severe obesity, which is further complicated by stress urinary incontinence and obstructive sleep apnea - CPAP  that the patient is now seeking out bariatric surgery, specifically, gastric bypass. Past Medical History:   Diagnosis Date    Anxiety     Dysfunctional voiding of urine     Microhematuria     Pelvic pain     Psychiatric disorder     depression/  anxiety    Recurrent UTI     Solitary kidney, acquired     Stress incontinence        Past Surgical History:   Procedure Laterality Date    HX  SECTION      HX HYSTERECTOMY      partial hysterectomy 2/2 painful menses    HX NEPHRECTOMY Left 2000    pt. has right kidney    .  HX OTHER SURGICAL  2013    Cholecystectomy    HX UROLOGICAL  2016    CYSTOSCOPY.     HX UROLOGICAL      Interstem stimulation device       Current Outpatient Prescriptions   Medication Sig Dispense Refill    acetaminophen-codeine (TYLENOL #3) 300-30 mg per tablet       ciprofloxacin HCl (CIPRO) 250 mg tablet Take 1 Tab by mouth two (2) times a day for 5 days. 10 Tab 0    ALPRAZolam (XANAX) 1 mg tablet Take 1 Tab by mouth as needed.  Max Daily Amount: 1 mg. 30 Tab 2       Allergies   Allergen Reactions    Ibuprofen Other (comments) Patient has 1 kidney and can not take Ibuprofen    Morphine Other (comments)     GI DISTRESS       Sulfa (Sulfonamide Antibiotics) Other (comments)     GI DISTRESS     Sulfamethoxazole-Trimethoprim Unknown (comments)    Vicodin [Hydrocodone-Acetaminophen] Other (comments)     Psychological Reaction          Social History   Substance Use Topics    Smoking status: Never Smoker    Smokeless tobacco: Never Used    Alcohol use 0.6 oz/week     1 Shots of liquor per week      Comment:  rarely drink   Q 3 mos. Family History   Problem Relation Age of Onset    Elevated Lipids Mother     Hypertension Mother     No Known Problems Father        Family Status   Relation Status    Mother Alive    Father        Review of Systems:  Positive in BOLD    CONST: Fever, weight loss, fatigue or chills  GI: Nausea, vomiting, abdominal pain, change in bowel habits, hematochezia, melena, and GERD   INTEG: Dermatitis, abnormal moles  HEENT: Recent changes in vision, vertigo, epistaxis, dysphagia and hoarseness  CV: Chest pain, palpitations, HTN, edema and varicosities  RESP: Cough, shortness of breath, wheezing, hemoptysis, snoring and reactive airway disease  : Hematuria, dysuria, frequency, urgency, nocturia and stress urinary incontinence   MS: Weakness, joint pain and arthritis  ENDO: Diabetes, thyroid disease, polyuria, polydipsia, polyphagia, poor wound healing, heat intolerance, cold intolerance  LYMPH/HEME: Anemia, bruising and history of blood transfusions  NEURO: Dizziness, headache, fainting, seizures and stroke  PSYCH: Anxiety and depression      Physical Exam    Visit Vitals    /76 (BP 1 Location: Right arm, BP Patient Position: At rest)    Pulse 68    Temp 96.4 °F (35.8 °C) (Oral)    Resp 20    Ht 5' 2\" (1.575 m)    Wt 107.5 kg (237 lb)    BMI 43.35 kg/m2       Pre op weight: 237  EBW: 112  Wt loss to date: 0       General: 38 y.o.) female in no acute distress.  Morbidly obese in abdomen - mixed pattern  HEENT: Normocephalic, atraumatic, Pupils equal and reactive, nasopharynx clear, oropharynx clear and moist without lesions  NECK: Supple, no lymphadenopathy, thyromegaly, carotid bruits or jugular venous distension. trachea midline  RESP: Clear to auscultation bilaterally, no wheezes, rhonchi, or rales, normal respiratory excursion  CV: Regular rate and rhythm, no murmurs, rubs or gallops. 3+/4 pulses in bilateral dorsalis pedis and posterior tibialis. No distal edema or varicosities. ABD: Soft, nontender, nondistended, normoactive bowel sounds, no hernias, no hepatosplenomegaly, easily palpable costal margins, mixed distribution, healed midline and pfannenstiel incision  Extremities: Warm, well perfused, no tenderness or swelling, normal gait/station  Neuro: Sensation and strength grossly intact and symmetrical  Psych: Alert and oriented to person, place, and time. Impression:    Desiree Griffin is a 38 y.o. female who is suffering from morbid obesity with a BMI of 43  and comorbidities including stress urinary incontinence and obstructive sleep apnea - CPAP  who would benefit from bariatric surgery. We have had an extensive discussion with regard to the risks, benefits and likely outcomes of the operation. We've discussed the restrictive and malabsorptive nature of the gastric bypass and compared and contrasted with the sleeve gastrectomy. The patient understands the likelihood of losing approximately 80% of their excess weight in 12 to 18 months. She also understands the risks including but not limited to bleeding, infection, need for reoperation, ulcers, leaks and strictures, bowel obstruction secondary to adhesions and internal hernias, DVT, PE, heart attack, stroke, and death. Patient also understands risks of inadequate weight loss, excess weight loss, vitamin insufficiency, protein malnutrition, excess skin, and loss of hair.   We have reviewed the components of a successful postoperative course including requirement for a high protein, low carbohydrate diet, 60 oz a day of zero calorie liquids, daily vitamin supplementation, daily exercise, regular follow-up, and participation in support groups. At this time we will enroll the patient in our bariatric program, undertake routine laboratory evaluation, chest X-ray, EKG, possible UGI and evaluation by  nutritionist as well as psychologist and pending their satisfactory completion of the preop evaluation, plan to perform a gastric bypass.

## 2018-03-14 NOTE — LETTER
3/14/2018 1:25 PM 
 
Patient:  Kiran Weston YOB: 1975 Date of Visit: 3/14/2018 Scottie Raphael MD 
[address] [facility] [address] [place] [address] VIA In Basket Dear Scottie Raphael MD, Thank you for referring Ms. Chalino Lima to [facility]  for evaluation and treatment. Below are the relevant portions of my assessment and plan of care. Initial Consultation for Bariatric Surgery Template (Gastric Bypass) Kiran Weston is a 38 y.o. female who comes into the office today for initial consultation for the surgical options for the treatment of morbid obesity. The patient initially identified obesity at the age of 27 and at age 23 weighed 128lbs. She has tried a variety of unsupervised weight-loss attempts including Weight Watchers and family physician, but has yet to meet with lasting success. Maximum weight lost on a diet is about 15 lbs, but that the weight loss always seems to return. Today, the patient is  Height: 5' 2\" (157.5 cm) tall, Weight: 107.5 kg (237 lb) lbs for a Body mass index is 43.35 kg/(m^2). It is due to the patient's severe obesity, which is further complicated by stress urinary incontinence and obstructive sleep apnea - CPAP  that the patient is now seeking out bariatric surgery, specifically, gastric bypass. Past Medical History:  
Diagnosis Date  Anxiety  Dysfunctional voiding of urine  Microhematuria  Pelvic pain  Psychiatric disorder   
 depression/  anxiety  Recurrent UTI  Solitary kidney, acquired  Stress incontinence Past Surgical History:  
Procedure Laterality Date  HX  SECTION    
 HX HYSTERECTOMY  2012  
 partial hysterectomy 2/2 painful menses  HX NEPHRECTOMY Left 2000  
 pt. has right kidney    .  HX OTHER SURGICAL  2013 Cholecystectomy  HX UROLOGICAL  2016 CYSTOSCOPY. Corbett Epley  HX UROLOGICAL Interstem stimulation device Current Outpatient Prescriptions Medication Sig Dispense Refill  acetaminophen-codeine (TYLENOL #3) 300-30 mg per tablet  ciprofloxacin HCl (CIPRO) 250 mg tablet Take 1 Tab by mouth two (2) times a day for 5 days. 10 Tab 0  ALPRAZolam (XANAX) 1 mg tablet Take 1 Tab by mouth as needed. Max Daily Amount: 1 mg. 30 Tab 2 Allergies Allergen Reactions  Ibuprofen Other (comments) Patient has 1 kidney and can not take Ibuprofen  Morphine Other (comments) GI DISTRESS  
  
 Sulfa (Sulfonamide Antibiotics) Other (comments) GI DISTRESS   
 Sulfamethoxazole-Trimethoprim Unknown (comments)  Vicodin [Hydrocodone-Acetaminophen] Other (comments) Psychological Reaction Social History Substance Use Topics  Smoking status: Never Smoker  Smokeless tobacco: Never Used  Alcohol use 0.6 oz/week 1 Shots of liquor per week Comment:  rarely drink   Q 3 mos. Family History Problem Relation Age of Onset  Elevated Lipids Mother  Hypertension Mother  No Known Problems Father Family Status Relation Status  Mother Alive  Father  Review of Systems:  Positive in BOLD 
 
CONST: Fever, weight loss, fatigue or chills GI: Nausea, vomiting, abdominal pain, change in bowel habits, hematochezia, melena, and GERD INTEG: Dermatitis, abnormal moles HEENT: Recent changes in vision, vertigo, epistaxis, dysphagia and hoarseness CV: Chest pain, palpitations, HTN, edema and varicosities RESP: Cough, shortness of breath, wheezing, hemoptysis, snoring and reactive airway disease : Hematuria, dysuria, frequency, urgency, nocturia and stress urinary incontinence MS: Weakness, joint pain and arthritis ENDO: Diabetes, thyroid disease, polyuria, polydipsia, polyphagia, poor wound healing, heat intolerance, cold intolerance LYMPH/HEME: Anemia, bruising and history of blood transfusions NEURO: Dizziness, headache, fainting, seizures and stroke PSYCH: Anxiety and depression Physical Exam 
 
Visit Vitals  /76 (BP 1 Location: Right arm, BP Patient Position: At rest)  Pulse 68  Temp 96.4 °F (35.8 °C) (Oral)  Resp 20  
 Ht 5' 2\" (1.575 m)  Wt 107.5 kg (237 lb)  BMI 43.35 kg/m2 Pre op weight: 237 EBW: 112 Wt loss to date: 0 General: 38 y.o.) female in no acute distress. Morbidly obese in abdomen - mixed pattern HEENT: Normocephalic, atraumatic, Pupils equal and reactive, nasopharynx clear, oropharynx clear and moist without lesions NECK: Supple, no lymphadenopathy, thyromegaly, carotid bruits or jugular venous distension. trachea midline RESP: Clear to auscultation bilaterally, no wheezes, rhonchi, or rales, normal respiratory excursion CV: Regular rate and rhythm, no murmurs, rubs or gallops. 3+/4 pulses in bilateral dorsalis pedis and posterior tibialis. No distal edema or varicosities. ABD: Soft, nontender, nondistended, normoactive bowel sounds, no hernias, no hepatosplenomegaly, easily palpable costal margins, mixed distribution, healed midline and pfannenstiel incision Extremities: Warm, well perfused, no tenderness or swelling, normal gait/station Neuro: Sensation and strength grossly intact and symmetrical 
Psych: Alert and oriented to person, place, and time. Impression: 
 
Kiran Weston is a 38 y.o. female who is suffering from morbid obesity with a BMI of 43  and comorbidities including stress urinary incontinence and obstructive sleep apnea - CPAP  who would benefit from bariatric surgery. We have had an extensive discussion with regard to the risks, benefits and likely outcomes of the operation. We've discussed the restrictive and malabsorptive nature of the gastric bypass and compared and contrasted with the sleeve gastrectomy.   The patient understands the likelihood of losing approximately 80% of their excess weight in 12 to 18 months. She also understands the risks including but not limited to bleeding, infection, need for reoperation, ulcers, leaks and strictures, bowel obstruction secondary to adhesions and internal hernias, DVT, PE, heart attack, stroke, and death. Patient also understands risks of inadequate weight loss, excess weight loss, vitamin insufficiency, protein malnutrition, excess skin, and loss of hair. We have reviewed the components of a successful postoperative course including requirement for a high protein, low carbohydrate diet, 60 oz a day of zero calorie liquids, daily vitamin supplementation, daily exercise, regular follow-up, and participation in support groups. At this time we will enroll the patient in our bariatric program, undertake routine laboratory evaluation, chest X-ray, EKG, possible UGI and evaluation by  nutritionist as well as psychologist and pending their satisfactory completion of the preop evaluation, plan to perform a gastric bypass. Thank you very much for your referral of Ms. Yvonne Dover. If you have questions, please do not hesitate to call me. I look forward to following Ms. Alexis along with you and will keep you updated as to her progress.   
 
 
 
 
Sincerely, 
 
 
Herb Naylor MD

## 2018-03-17 LAB
25(OH)D3+25(OH)D2 SERPL-MCNC: NORMAL NG/ML
ALBUMIN SERPL-MCNC: NORMAL G/DL
ALP SERPL-CCNC: NORMAL U/L
ALT SERPL-CCNC: NORMAL U/L
AST SERPL-CCNC: NORMAL U/L
BASOPHILS # BLD AUTO: NORMAL 10*3/UL
BILIRUB SERPL-MCNC: NORMAL MG/DL
BUN SERPL-MCNC: NORMAL MG/DL
CALCIUM SERPL-MCNC: NORMAL MG/DL
CHLORIDE SERPL-SCNC: NORMAL MMOL/L
CHOLEST SERPL-MCNC: NORMAL MG/DL
CO2 SERPL-SCNC: NORMAL MMOL/L
CREAT SERPL-MCNC: NORMAL MG/DL
EOSINOPHIL # BLD AUTO: NORMAL 10*3/UL
EOSINOPHIL NFR BLD AUTO: NORMAL %
FERRITIN SERPL-MCNC: NORMAL NG/ML
FOLATE SERPL-MCNC: NORMAL NG/ML
GLUCOSE SERPL-MCNC: NORMAL MG/DL
HCT VFR BLD AUTO: NORMAL %
HDLC SERPL-MCNC: NORMAL MG/DL
HGB BLD-MCNC: NORMAL G/DL
IRON SERPL-MCNC: NORMAL UG/DL
LYMPHOCYTES # BLD AUTO: NORMAL 10*3/UL
LYMPHOCYTES NFR BLD AUTO: NORMAL %
MONOCYTES NFR BLD AUTO: NORMAL %
NEUTROPHILS NFR BLD AUTO: NORMAL %
PLATELET # BLD AUTO: NORMAL 10*3/UL
POTASSIUM SERPL-SCNC: NORMAL MMOL/L
PROT SERPL-MCNC: NORMAL G/DL
RBC # BLD AUTO: NORMAL 10*6/UL
SODIUM SERPL-SCNC: NORMAL MMOL/L
TRIGL SERPL-MCNC: NORMAL MG/DL (ref ?–150)
TSH SERPL DL<=0.005 MIU/L-ACNC: NORMAL UIU/ML
UREA BREATH TEST QL: NEGATIVE
UREA BREATH TEST QL: NORMAL
VIT B1 BLD-SCNC: NORMAL NMOL/L
VIT B12 SERPL-MCNC: NORMAL PG/ML
WBC # BLD AUTO: NORMAL X10E3/UL

## 2018-03-27 ENCOUNTER — DOCUMENTATION ONLY (OUTPATIENT)
Dept: SURGERY | Age: 43
End: 2018-03-27

## 2018-03-27 NOTE — PROGRESS NOTES
[facility] [facility] [address] [facility]  [address] [facility], [facility]    Patient's Name: Kiran Weston                                  Age: 38 y.o. YOB: 1975                                          Sex: female  Date: 03/22/2018  Insurance: [employer]                          Session: 1 of 6               Surgeon:  Dr. Yariel Spencer    Height: 5'2\"                    Weight: 233#           Starting weight with surgeon: 237#          Do you smoke?: no    Alcohol Intake:   I do not drink at all:x      Class Guidelines    Pt. Understand that if they miss a month, depending on insurance, they may have to start over. Pt. Also understand that the expectation is to lose  Or maintain weight. Weight gain may result in delaying surgery until the weight is off. EATING HABITS AND BEHAVIORS:  Pt. Struggles With:  Liquid calories:   Skipping breakfast: x  Eating foods with a high amount of carbohydrates: x  Eating High fat foods: x  Eating large portions:   Making poor snack choices:  Eating out frequently:   Skipping meals: x  Reading labels: x  Drinking >48 oz fluids daily: x  Getting sufficient physical activity/exercise:   Night time eating/snacking: x  Binge eating:   Eating often, even when not hungry (grazing): x  Giving into peer pressure regarding eating/drinking:   Other:     Each class we spend time discussing the pre-op diet, diet progression and vitamin/mineral requirements as well as the Key Diet Principles. Each class we also cover lowering carbohydrate consumption. Keeping carbohydrates <25 grams per meal and avoiding added sugars is emphasized. Patient is educated on the effects that  carbohydrates and bad fats have on them.       PHYSICAL ACTIVITY/EXERCISE:   Patient's activity is increasing because patient plans to or is:  Walking more: x  Other aerobic activity such as running, swimming, [place], kickboxing ect.:   Chair exercises: x  Active in resistance training such as weight lifting:   Other:     Each class we spend time discussing the importance of increasing activity. Patient can start with 10 minutes of walking daily or chair exercises and build from there. BEHAVIOR MODIFICATION:  Making modifications to behavior, patient plans to or is:  Eating only when hungry not to treat stress, anger, tiredness or boredom: x  Eating away from TV, computer and phone: x  Eating on a small plate: x  Eats slowly, chewing food well: x  Other: We spend time in each class discussing the importance of breaking bad habits and how to do this. I encourage pt.s to self evaluate and look for those crucial moments in which they are making these poor choices. I recommend a food journal which can help identify when/where//why/who we are with when we compromise and make exceptions that are poor choices. ADDITIONAL INFORMATION:  Specific changes patient made since the last class:  1st class.       Emma Martell, GONZALO  03/22/2018

## 2018-04-06 ENCOUNTER — HOSPITAL ENCOUNTER (OUTPATIENT)
Dept: LAB | Age: 43
Discharge: HOME | End: 2018-04-06
Payer: COMMERCIAL

## 2018-04-06 ENCOUNTER — HOSPITAL ENCOUNTER (OUTPATIENT)
Dept: PREADMISSION TESTING | Age: 43
Discharge: HOME | End: 2018-04-06
Payer: COMMERCIAL

## 2018-04-06 DIAGNOSIS — E66.01 MORBID OBESITY (HCC): ICD-10-CM

## 2018-04-06 DIAGNOSIS — N39.3 STRESS INCONTINENCE: ICD-10-CM

## 2018-04-06 DIAGNOSIS — Z99.89 OSA ON CPAP: ICD-10-CM

## 2018-04-06 DIAGNOSIS — F41.9 ANXIETY: ICD-10-CM

## 2018-04-06 DIAGNOSIS — G47.33 OSA ON CPAP: ICD-10-CM

## 2018-04-06 LAB
25(OH)D3 SERPL-MCNC: 19.4 NG/ML (ref 30–100)
ALBUMIN SERPL-MCNC: 3.7 G/DL (ref 3.4–5)
ALBUMIN/GLOB SERPL: 1.1 {RATIO} (ref 0.8–1.7)
ALP SERPL-CCNC: 68 U/L (ref 45–117)
ALT SERPL-CCNC: 33 U/L (ref 13–56)
ANION GAP SERPL CALC-SCNC: 6 MMOL/L (ref 3–18)
AST SERPL-CCNC: 17 U/L (ref 15–37)
ATRIAL RATE: 70 BPM
BASOPHILS # BLD: 0 K/UL (ref 0–0.06)
BASOPHILS NFR BLD: 0 % (ref 0–2)
BILIRUB SERPL-MCNC: 0.6 MG/DL (ref 0.2–1)
BUN SERPL-MCNC: 15 MG/DL (ref 7–18)
BUN/CREAT SERPL: 17 (ref 12–20)
CALCIUM SERPL-MCNC: 8.4 MG/DL (ref 8.5–10.1)
CALCULATED P AXIS, ECG09: 14 DEGREES
CALCULATED R AXIS, ECG10: -12 DEGREES
CALCULATED T AXIS, ECG11: -12 DEGREES
CHLORIDE SERPL-SCNC: 104 MMOL/L (ref 100–108)
CHOLEST SERPL-MCNC: 179 MG/DL
CO2 SERPL-SCNC: 29 MMOL/L (ref 21–32)
CREAT SERPL-MCNC: 0.89 MG/DL (ref 0.6–1.3)
DIAGNOSIS, 93000: NORMAL
DIFFERENTIAL METHOD BLD: ABNORMAL
EOSINOPHIL # BLD: 0.3 K/UL (ref 0–0.4)
EOSINOPHIL NFR BLD: 3 % (ref 0–5)
ERYTHROCYTE [DISTWIDTH] IN BLOOD BY AUTOMATED COUNT: 13 % (ref 11.6–14.5)
FERRITIN SERPL-MCNC: 213 NG/ML (ref 8–388)
FOLATE SERPL-MCNC: 15.8 NG/ML (ref 3.1–17.5)
GLOBULIN SER CALC-MCNC: 3.5 G/DL (ref 2–4)
GLUCOSE SERPL-MCNC: 71 MG/DL (ref 74–99)
HCT VFR BLD AUTO: 42.2 % (ref 35–45)
HDLC SERPL-MCNC: 49 MG/DL (ref 40–60)
HDLC SERPL: 3.7 {RATIO} (ref 0–5)
HGB BLD-MCNC: 14 G/DL (ref 12–16)
IRON SERPL-MCNC: 80 UG/DL (ref 50–175)
LDLC SERPL CALC-MCNC: 89.2 MG/DL (ref 0–100)
LIPID PROFILE,FLP: ABNORMAL
LYMPHOCYTES # BLD: 1.6 K/UL (ref 0.9–3.6)
LYMPHOCYTES NFR BLD: 18 % (ref 21–52)
MCH RBC QN AUTO: 29.6 PG (ref 24–34)
MCHC RBC AUTO-ENTMCNC: 33.2 G/DL (ref 31–37)
MCV RBC AUTO: 89.2 FL (ref 74–97)
MONOCYTES # BLD: 0.7 K/UL (ref 0.05–1.2)
MONOCYTES NFR BLD: 8 % (ref 3–10)
NEUTS SEG # BLD: 6.6 K/UL (ref 1.8–8)
NEUTS SEG NFR BLD: 71 % (ref 40–73)
P-R INTERVAL, ECG05: 134 MS
PLATELET # BLD AUTO: 231 K/UL (ref 135–420)
PMV BLD AUTO: 10.5 FL (ref 9.2–11.8)
POTASSIUM SERPL-SCNC: 3.9 MMOL/L (ref 3.5–5.5)
PROT SERPL-MCNC: 7.2 G/DL (ref 6.4–8.2)
Q-T INTERVAL, ECG07: 374 MS
QRS DURATION, ECG06: 88 MS
QTC CALCULATION (BEZET), ECG08: 403 MS
RBC # BLD AUTO: 4.73 M/UL (ref 4.2–5.3)
SODIUM SERPL-SCNC: 139 MMOL/L (ref 136–145)
TRIGL SERPL-MCNC: 204 MG/DL (ref ?–150)
TSH SERPL DL<=0.05 MIU/L-ACNC: 1.42 UIU/ML (ref 0.36–3.74)
VENTRICULAR RATE, ECG03: 70 BPM
VIT B12 SERPL-MCNC: 428 PG/ML (ref 211–911)
VLDLC SERPL CALC-MCNC: 40.8 MG/DL
WBC # BLD AUTO: 9.2 K/UL (ref 4.6–13.2)

## 2018-04-06 PROCEDURE — 36415 COLL VENOUS BLD VENIPUNCTURE: CPT | Performed by: SURGERY

## 2018-04-06 PROCEDURE — 84425 ASSAY OF VITAMIN B-1: CPT | Performed by: SURGERY

## 2018-04-06 PROCEDURE — 80061 LIPID PANEL: CPT | Performed by: SURGERY

## 2018-04-06 PROCEDURE — 82607 VITAMIN B-12: CPT | Performed by: SURGERY

## 2018-04-06 PROCEDURE — 82728 ASSAY OF FERRITIN: CPT | Performed by: SURGERY

## 2018-04-06 PROCEDURE — 83540 ASSAY OF IRON: CPT | Performed by: SURGERY

## 2018-04-06 PROCEDURE — 80053 COMPREHEN METABOLIC PANEL: CPT | Performed by: SURGERY

## 2018-04-06 PROCEDURE — 84443 ASSAY THYROID STIM HORMONE: CPT | Performed by: SURGERY

## 2018-04-06 PROCEDURE — 85025 COMPLETE CBC W/AUTO DIFF WBC: CPT | Performed by: SURGERY

## 2018-04-06 PROCEDURE — 93005 ELECTROCARDIOGRAM TRACING: CPT

## 2018-04-06 PROCEDURE — 82306 VITAMIN D 25 HYDROXY: CPT | Performed by: SURGERY

## 2018-04-10 LAB — VIT B1 BLD-SCNC: 196.7 NMOL/L (ref 66.5–200)

## 2018-04-30 ENCOUNTER — DOCUMENTATION ONLY (OUTPATIENT)
Dept: SURGERY | Age: 43
End: 2018-04-30

## 2018-04-30 NOTE — PROGRESS NOTES
[place] [address] [facility]  [address] [facility], [facility]    Patient's Name: Chyna Smith                                  Age: 37 y.o. YOB: 1975                                          Sex: female  Date: 04/19/2018  Insurance: [employer]                          Session: 2 of 6               Surgeon:  Dr. Sarah Alvarado    Height: 5'2\"                    Weight: 229#           Starting weight with surgeon: 237#          Do you smoke?: no    Alcohol Intake:   I do not drink at all:x      Class Guidelines    Pt. Understand that if they miss a month, depending on insurance, they may have to start over. Pt. Also understand that the expectation is to lose  Or maintain weight. Weight gain may result in delaying surgery until the weight is off. EATING HABITS AND BEHAVIORS:  Pt. Struggles With:  Liquid calories:   Skipping breakfast:   Eating foods with a high amount of carbohydrates:   Eating High fat foods: x  Eating large portions:   Making poor snack choices:  Eating out frequently:   Skipping meals:   Reading labels:   Drinking >48 oz fluids daily:   Getting sufficient physical activity/exercise: x  Night time eating/snacking:   Binge eating:   Eating often, even when not hungry (grazing):   Giving into peer pressure regarding eating/drinking:   Other:     Each class we spend time discussing the pre-op diet, diet progression and vitamin/mineral requirements as well as the Key Diet Principles. Each class we also cover lowering carbohydrate consumption. Keeping carbohydrates <25 grams per meal and avoiding added sugars is emphasized. Patient is educated on the effects that  carbohydrates and bad fats have on them. PHYSICAL ACTIVITY/EXERCISE:   Patient's activity is increasing because patient plans to or is:  Walking more: x  Other aerobic activity such as running, swimming, [place], kickboxing ect.:   Chair exercises:    Active in resistance training such as weight lifting:   Other:     Each class we spend time discussing the importance of increasing activity. Patient can start with 10 minutes of walking daily or chair exercises and build from there. BEHAVIOR MODIFICATION:  Making modifications to behavior, patient plans to or is:  Eating only when hungry not to treat stress, anger, tiredness or boredom:   Eating away from TV, computer and phone: x  Eating on a small plate: x  Eats slowly, chewing food well: x  Other: We spend time in each class discussing the importance of breaking bad habits and how to do this. I encourage pt.s to self evaluate and look for those crucial moments in which they are making these poor choices. I recommend a food journal which can help identify when/where//why/who we are with when we compromise and make exceptions that are poor choices. ADDITIONAL INFORMATION:  Specific changes patient made since the last class:  Stop drinking sugary drinks, candies chips, Taking lunch and dinner to school. Stared walking on breaks. RD's concerns/opinion's:  Patient has started making positive changes.     Ramone Flanagan RD  04/19/2018

## 2018-05-09 ENCOUNTER — DOCUMENTATION ONLY (OUTPATIENT)
Dept: SURGERY | Age: 43
End: 2018-05-09

## 2018-05-22 ENCOUNTER — DOCUMENTATION ONLY (OUTPATIENT)
Dept: SURGERY | Age: 43
End: 2018-05-22

## 2018-05-22 NOTE — PROGRESS NOTES
[place] [address] [facility]  [address] [facility], [facility]    Patient's Name: Danish Rivas                                  Age: 37 y.o. YOB: 1975                                          Sex: female  Date: 05/16/2018  Insurance: [employer]                          Session: 3 of 6               Surgeon:  Dr. Carmelina Pandya    Height: 5'2\"                    Weight: 228#           Starting weight with surgeon: 227#          Do you smoke?: no    Alcohol Intake:   I do not drink at all:x      Class Guidelines    Pt. Understand that if they miss a month, depending on insurance, they may have to start over. Pt. Also understand that the expectation is to lose  Or maintain weight. Weight gain may result in delaying surgery until the weight is off. EATING HABITS AND BEHAVIORS:  Pt. Struggles With:  Liquid calories:   Skipping breakfast: x  Eating foods with a high amount of carbohydrates:   Eating High fat foods:   Eating large portions:   Making poor snack choices:  Eating out frequently:   Skipping meals: x  Reading labels:   Drinking >48 oz fluids daily:   Getting sufficient physical activity/exercise:   Night time eating/snacking: x  Binge eating:   Eating often, even when not hungry (grazing):   Giving into peer pressure regarding eating/drinking:   Other:     Each class we spend time discussing the pre-op diet, diet progression and vitamin/mineral requirements as well as the Key Diet Principles. Each class we also cover lowering carbohydrate consumption. Keeping carbohydrates <25 grams per meal and avoiding added sugars is emphasized. Patient is educated on the effects that  carbohydrates and bad fats have on them. PHYSICAL ACTIVITY/EXERCISE:   Patient's activity is increasing because patient plans to or is:  Walking more: x  Other aerobic activity such as running, swimming, Vale, kickboxing ect.:   Chair exercises:    Active in resistance training such as weight lifting:   Other:     Each class we spend time discussing the importance of increasing activity. Patient can start with 10 minutes of walking daily or chair exercises and build from there. BEHAVIOR MODIFICATION:  Making modifications to behavior, patient plans to or is:  Eating only when hungry not to treat stress, anger, tiredness or boredom:   Eating away from TV, computer and phone: x  Eating on a small plate: x  Eats slowly, chewing food well: Other: We spend time in each class discussing the importance of breaking bad habits and how to do this. I encourage pt.s to self evaluate and look for those crucial moments in which they are making these poor choices. I recommend a food journal which can help identify when/where//why/who we are with when we compromise and make exceptions that are poor choices. ADDITIONAL INFORMATION:  Specific changes patient made since the last class:  Started walking more, getting more sleep.       Fred Peterson RD  05/16/2018

## 2018-05-26 ENCOUNTER — OFFICE VISIT (OUTPATIENT)
Dept: FAMILY MEDICINE CLINIC | Age: 43
End: 2018-05-26

## 2018-05-26 ENCOUNTER — HOSPITAL ENCOUNTER (OUTPATIENT)
Dept: LAB | Age: 43
Discharge: HOME | End: 2018-05-26
Payer: COMMERCIAL

## 2018-05-26 VITALS
BODY MASS INDEX: 41.41 KG/M2 | HEIGHT: 62 IN | DIASTOLIC BLOOD PRESSURE: 70 MMHG | RESPIRATION RATE: 20 BRPM | HEART RATE: 82 BPM | OXYGEN SATURATION: 96 % | SYSTOLIC BLOOD PRESSURE: 115 MMHG | WEIGHT: 225 LBS | TEMPERATURE: 97.7 F

## 2018-05-26 DIAGNOSIS — Z12.4 SCREENING FOR CERVICAL CANCER: ICD-10-CM

## 2018-05-26 DIAGNOSIS — R30.0 DYSURIA: Primary | ICD-10-CM

## 2018-05-26 PROCEDURE — 88175 CYTOPATH C/V AUTO FLUID REDO: CPT | Performed by: FAMILY MEDICINE

## 2018-05-26 PROCEDURE — 87624 HPV HI-RISK TYP POOLED RSLT: CPT | Performed by: FAMILY MEDICINE

## 2018-05-26 NOTE — PATIENT INSTRUCTIONS
Painful Urination (Dysuria): Care Instructions  Your Care Instructions  Burning pain with urination (dysuria) is a common symptom of a urinary tract infection or other urinary problems. The bladder may become inflamed. This can cause pain when the bladder fills and empties. You may also feel pain if the tube that carries urine from the bladder to the outside of the body (urethra) gets irritated or infected. Sexually transmitted infections (STIs) also may cause pain when you urinate. Sometimes the pain can be caused by things other than an infection. The urethra can be irritated by soaps, perfumes, or foreign objects in the urethra. Kidney stones can cause pain when they pass through the urethra. The cause may be hard to find. You may need tests. Treatment for painful urination depends on the cause. Follow-up care is a key part of your treatment and safety. Be sure to make and go to all appointments, and call your doctor if you are having problems. It's also a good idea to know your test results and keep a list of the medicines you take. How can you care for yourself at home? · Drink extra water for the next day or two. This will help make the urine less concentrated. (If you have kidney, heart, or liver disease and have to limit fluids, talk with your doctor before you increase the amount of fluids you drink.)  · Avoid drinks that are carbonated or have caffeine. They can irritate the bladder. · Urinate often. Try to empty your bladder each time. For women:  · Urinate right after you have sex. · After going to the bathroom, wipe from front to back. · Avoid douches, bubble baths, and feminine hygiene sprays. And avoid other feminine hygiene products that have deodorants. When should you call for help? Call your doctor now or seek immediate medical care if:  ? · You have new symptoms, such as fever, nausea, or vomiting. ? · You have new or worse symptoms of a urinary problem.  For example:  Ava Navarrete have blood or pus in your urine. ¨ You have chills or body aches. ¨ It hurts worse to urinate. ¨ You have groin or belly pain. ¨ You have pain in your back just below your rib cage (the flank area). ? Watch closely for changes in your health, and be sure to contact your doctor if you have any problems. Where can you learn more? Go to http://roro-kenyatta.info/. Enter V560 in the search box to learn more about \"Painful Urination (Dysuria): Care Instructions. \"  Current as of: May 12, 2017  Content Version: 11.4  © 2993-4509 [employer]. Care instructions adapted under license by [employer] (which disclaims liability or warranty for this information). If you have questions about a medical condition or this instruction, always ask your healthcare professional. [address] any warranty or liability for your use of this information. Pap Test: Care Instructions  Your Care Instructions    The Pap test (also called a Pap smear) is a screening test for cancer of the cervix, which is the lower part of the uterus that opens into the vagina. The test can help your doctor find early changes in the cells that could lead to cancer. The sample of cells taken during your test has been sent to a lab so that an expert can look at the cells. It usually takes a week or two to get the results back. Follow-up care is a key part of your treatment and safety. Be sure to make and go to all appointments, and call your doctor if you are having problems. It's also a good idea to know your test results and keep a list of the medicines you take. What do the results mean? · A normal result means that the test did not find any abnormal cells in the sample. · An abnormal result can mean many things. Most of these are not cancer. The results of your test may be abnormal because:  ¨ You have an infection of the vagina or cervix, such as a yeast infection.   ¨ You have an IUD (intrauterine device for birth control). ¨ You have low estrogen levels after menopause that are causing the cells to change. ¨ You have cell changes that may be a sign of precancer or cancer. The results are ranked based on how serious the changes might be. There are many other reasons why you might not get a normal result. If the results were abnormal, you may need to get another test within a few weeks or months. If the results show changes that could be a sign of cancer, you may need a test called a colposcopy, which provides a more complete view of the cervix. Sometimes the lab cannot use the sample because it does not contain enough cells or was not preserved well. If so, you may need to have the test again. This is not common, but it does happen from time to time. When should you call for help? Watch closely for changes in your health, and be sure to contact your doctor if:  ? · You have vaginal bleeding or pain for more than 2 days after the test. It is normal to have a small amount of bleeding for a day or two after the test.   Where can you learn more? Go to http://roro-kenyatta.info/. Enter X861 in the search box to learn more about \"Pap Test: Care Instructions. \"  Current as of: May 12, 2017  Content Version: 11.4  © 0367-0854 [employer]. Care instructions adapted under license by [employer] (which disclaims liability or warranty for this information). If you have questions about a medical condition or this instruction, always ask your healthcare professional. [place] [address] any warranty or liability for your use of this information.

## 2018-05-26 NOTE — PROGRESS NOTES
Royal Vanegas is a 37 y.o. female (: 1975) presenting to address:    Chief Complaint   Patient presents with    Well Woman       Vitals:    18 1308   BP: 115/70   Pulse: 82   Resp: 20   Temp: 97.7 °F (36.5 °C)   TempSrc: Oral   SpO2: 96%   Weight: 225 lb (102.1 kg)   Height: 5' 2\" (1.575 m)   PainSc:   0 - No pain       Hearing/Vision:   No exam data present    Learning Assessment:     Learning Assessment 2017   PRIMARY LEARNER Patient   BARRIERS PRIMARY LEARNER NONE   CO-LEARNER CAREGIVER No   PRIMARY LANGUAGE ENGLISH   LEARNER PREFERENCE PRIMARY DEMONSTRATION     VIDEOS   ANSWERED BY patient    RELATIONSHIP SELF     Depression Screening:     PHQ over the last two weeks 2018   Little interest or pleasure in doing things Not at all   Feeling down, depressed or hopeless -   Total Score PHQ 2 -     Fall Risk Assessment:     Fall Risk Assessment, last 12 mths 2/15/2017   Able to walk? Yes   Fall in past 12 months? No     Abuse Screening:     Abuse Screening Questionnaire 2/15/2017   Do you ever feel afraid of your partner? N   Are you in a relationship with someone who physically or mentally threatens you? N   Is it safe for you to go home? Y     Coordination of Care Questionaire:   1. Have you been to the ER, urgent care clinic since your last visit? Hospitalized since your last visit? YES, [facility], May 2018 post fall    2. Have you seen or consulted any other health care providers outside of the [facility] since your last visit? Include any pap smears or colon screening.  NO

## 2018-05-26 NOTE — LETTER
6/1/2018 1:22 PM 
 
Ms. Pascale Wells [address] [facility] [address] [facility] [address] Dear Pascale Wells: Please find your most recent results below. Resulted Orders CX-VAG CYTOLOGY Narrative Ylct-Ivaprtymx-Ndy[facility] [address]  [facility] 
[address] [facility] [address]      [address]         [address] [facility]      [place], [address]     [address] 
[phone] Fax# [phone]    Fax# [phone]      Fax# [phone] 
 
========================================================================== 
                       * * * CYTOPATHOLOGY REPORT* * *   
========================================================================== 
GYNECOLOGICAL * * *Procedures/Addenda Present * * * Patient:  Inga Funez             Specimen #:  [id number] Age:  1975 (Age: 37)                Date of Procedure: 5/26/2018 Sex:  F                                  Date of Receipt:  5/29/2018 Hospital#:  [id number]               Date of Report: 5/31/2018 Med. Record #:  [id number] Location:  [facility]) Physician(s):  Evette Jin M.D. ([id number]) * * * CLINICAL HISTORY* * * Menstrual History: Hysterectomy-Supra-Cervical 
 
ADDITIONAL PATIENT INFORMATION:  
RFX 16 , 18 / 45 HPV REGARDLESS:  
YES  
SOURCE: 
A: Cervical/Endocervical Imaged Processed Thin Prep 
 
 
============================================================================ 
                                * * * FINAL INTERPRETATION* * * Cervical/Endocervical Imaged Processed Thin Prep Satisfactory for evaluation. NEGATIVE FOR INTRAEPITHELIAL LESION OR MALIGNANCY. Elvis Yap,  CT (AS [place]  HPV HR                     
      
 
 
 Interpretation Test: HPV, high-risk Result: NEGATIVE Reference Interval: Negative This high-risk HPV test detects fourteen high-risk types 
(16/18/31/33/35/39/45/51/52/56/58/59/66/68) without differentiation, using 
nucleic acid amplification method. Test performed by: [facility]  Dr. Gilson Rodriguez [address] [place] [address] Phone number:  [phone] [facility] * * *Electronically Signed* * * 
5/31/2018 ICD10 Codes: 
 Z12.4 The Pap test is a screening procedure to aid in the detection of cervical 
cancer and its precursors. It should not be used as the sole means of 
detecting cervical cancer. As with any laboratory test, false negative 
and false positive results are known to occur. RECOMMENDATIONS: 
Pls report normal pap. Please schedule routine follow up Please call me if you have any questions: [phone] Sincerely, [facility] LAB OUTREACH INSURANCE

## 2018-05-28 NOTE — PROGRESS NOTES
Kalia Christianson is a 37 y.o.  female and presents with need for pap and to check for dysuria. Patient has baseline micro blood in urine. Chief Complaint   Patient presents with    Well Woman     Subjective: Additional Concerns: none     Patient Active Problem List    Diagnosis Date Noted    Morbid obesity ([facility] [address] 2018    MELISSA on CPAP 2018    Dysfunctional voiding of urine     Incomplete bladder emptying     Pelvic pain     Urinary incontinence     BMI 40.0-44.9, adult ([facility] [address] 2017    Anxiety 2017    Urinary incontinence without sensory awareness     Solitary kidney, acquired     Recurrent UTI     Psychiatric disorder     Microhematuria     Stress incontinence      Current Outpatient Prescriptions   Medication Sig Dispense Refill    acetaminophen-codeine (TYLENOL #3) 300-30 mg per tablet       ALPRAZolam (XANAX) 1 mg tablet Take 1 Tab by mouth as needed. Max Daily Amount: 1 mg. 30 Tab 2     Allergies   Allergen Reactions    Ibuprofen Other (comments)     Patient has 1 kidney and can not take Ibuprofen    Morphine Other (comments)     GI DISTRESS       Sulfa (Sulfonamide Antibiotics) Other (comments)     GI DISTRESS     Sulfamethoxazole-Trimethoprim Unknown (comments)    Vicodin [Hydrocodone-Acetaminophen] Other (comments)     Psychological Reaction        Past Medical History:   Diagnosis Date    Anxiety     Dysfunctional voiding of urine     Microhematuria     Pelvic pain     Psychiatric disorder     depression/  anxiety    Recurrent UTI     Sleep apnea     Solitary kidney, acquired     Stress incontinence      Past Surgical History:   Procedure Laterality Date    HX  SECTION      HX CHOLECYSTECTOMY      HX HYSTERECTOMY      partial hysterectomy 2/2 painful menses    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      HX UROLOGICAL Interstem stimulation device     Family History   Problem Relation Age of Onset    Elevated Lipids Mother     Hypertension Mother     No Known Problems Father      Social History   Substance Use Topics    Smoking status: Never Smoker    Smokeless tobacco: Never Used    Alcohol use 0.6 oz/week     1 Shots of liquor per week      Comment:  rarely drink   Q 3 mos. ROS     General: negative for - chills, fatigue, fever, weight change  GI: negative for - abdominal pain, change in bowel habits, constipation, diarrhea or nausea/vomiting  : positive for - dysuria, no gross hematuria, incontinence, pelvic pain or vulvar/vaginal symptoms    Objective:  Vitals:    05/26/18 1308   BP: 115/70   Pulse: 82   Resp: 20   Temp: 97.7 °F (36.5 °C)   TempSrc: Oral   SpO2: 96%   Weight: 225 lb (102.1 kg)   Height: 5' 2\" (1.575 m)   PainSc:   0 - No pain     PE    Alert, well appearing, and in no distress, oriented to person, place, and time and overweight  General appearance - alert, well appearing, and in no distress and oriented to person, place, and time  Mental status - alert, oriented to person, place, and time, normal mood, behavior, speech, dress, motor activity, and thought processes  Pelvic - no palpable internal organs, PAP: Pap smear done today, exam chaperoned by Alyssa on 04/27/2018   Component Date Value Ref Range Status    Urine Culture, Routine 04/27/2018 *  Final                    Value:Escherichia coli  10,000-25,000 colony forming units per mL      Comment: Cefazolin <=4 ug/mL  Cefazolin with an MAMIE <=16 predicts susceptibility to the oral agents  cefaclor, cefdinir, cefpodoxime, cefprozil, cefuroxime, cephalexin,  and loracarbef when used for therapy of uncomplicated urinary tract  infections due to E. coli, Klebsiella pneumoniae, and Proteus  mirabilis.       Color (UA POC) 04/27/2018 Yellow   Final    Clarity (UA POC) 04/27/2018 Clear   Final    Glucose (UA POC) 04/27/2018 Negative  Negative Final    Bilirubin (UA POC) 04/27/2018 Negative  Negative Final    Ketones (UA POC) 04/27/2018 Negative  Negative Final    Specific gravity (UA POC) 04/27/2018 1.020  1.001 - 1.035 Final    Blood (UA POC) 04/27/2018 2+  Negative Final    pH (UA POC) 04/27/2018 7.0  4.6 - 8.0 Final    Protein (UA POC) 04/27/2018 Negative  Negative Final    Urobilinogen (UA POC) 04/27/2018 0.2 mg/dL  0.2 - 1 Final    Nitrites (UA POC) 04/27/2018 Negative  Negative Final    Leukocyte esterase (UA POC) 04/27/2018 Trace  Negative Final   Hospital Outpatient Visit on 04/06/2018   Component Date Value Ref Range Status    Ventricular Rate 04/06/2018 70  BPM Final    Atrial Rate 04/06/2018 70  BPM Final    P-R Interval 04/06/2018 134  ms Final    QRS Duration 04/06/2018 88  ms Final    Q-T Interval 04/06/2018 374  ms Final    QTC Calculation (Bezet) 04/06/2018 403  ms Final    Calculated P Axis 04/06/2018 14  degrees Final    Calculated R Axis 04/06/2018 -12  degrees Final    Calculated T Axis 04/06/2018 -12  degrees Final    Diagnosis 04/06/2018    Final                    Value:Normal sinus rhythm  Nonspecific T wave abnormality  Abnormal ECG  No previous ECGs available  Confirmed by Gigi Hope ([phone]) on 4/6/2018 11:37:58 AM     Hospital Outpatient Visit on 04/06/2018   Component Date Value Ref Range Status    Vitamin B12 04/06/2018 428  211 - 911 pg/mL Final    Folate 04/06/2018 15.8  3.10 - 17.50 ng/mL Final    WBC 04/06/2018 9.2  4.6 - 13.2 K/uL Final    RBC 04/06/2018 4.73  4.20 - 5.30 M/uL Final    HGB 04/06/2018 14.0  12.0 - 16.0 g/dL Final    HCT 04/06/2018 42.2  35.0 - 45.0 % Final    MCV 04/06/2018 89.2  74.0 - 97.0 FL Final    MCH 04/06/2018 29.6  24.0 - 34.0 PG Final    MCHC 04/06/2018 33.2  31.0 - 37.0 g/dL Final    RDW 04/06/2018 13.0  11.6 - 14.5 % Final    PLATELET 87/97/4195 722  135 - 420 K/uL Final    MPV 04/06/2018 10.5  9.2 - 11.8 FL Final    NEUTROPHILS 04/06/2018 71  40 - 73 % Final    LYMPHOCYTES 04/06/2018 18* 21 - 52 % Final    MONOCYTES 04/06/2018 8  3 - 10 % Final    EOSINOPHILS 04/06/2018 3  0 - 5 % Final    BASOPHILS 04/06/2018 0  0 - 2 % Final    ABS. NEUTROPHILS 04/06/2018 6.6  1.8 - 8.0 K/UL Final    ABS. LYMPHOCYTES 04/06/2018 1.6  0.9 - 3.6 K/UL Final    ABS. MONOCYTES 04/06/2018 0.7  0.05 - 1.2 K/UL Final    ABS. EOSINOPHILS 04/06/2018 0.3  0.0 - 0.4 K/UL Final    ABS. BASOPHILS 04/06/2018 0.0  0.0 - 0.06 K/UL Final    DF 04/06/2018 AUTOMATED    Final    Ferritin 04/06/2018 213  8 - 388 NG/ML Final    Iron 04/06/2018 80  50 - 175 ug/dL Final    Patients receiving metal-binding drugs (e.g. deferoxamine) may show spuriously depressed iron values, as chelated iron may not properly react in the iron assay.  LIPID PROFILE 04/06/2018        Final    Cholesterol, total 04/06/2018 179  <200 MG/DL Final    Triglyceride 04/06/2018 204* <150 MG/DL Final    Comment: The drugs N-acetylcysteine (NAC) and  Metamiszole have been found to cause falsely  low results in this chemical assay. Please  be sure to submit blood samples obtained  BEFORE administration of either of these  drugs to assure correct results.       HDL Cholesterol 04/06/2018 49  40 - 60 MG/DL Final    LDL, calculated 04/06/2018 89.2  0 - 100 MG/DL Final    VLDL, calculated 04/06/2018 40.8  MG/DL Final    CHOL/HDL Ratio 04/06/2018 3.7  0 - 5.0   Final    Sodium 04/06/2018 139  136 - 145 mmol/L Final    Potassium 04/06/2018 3.9  3.5 - 5.5 mmol/L Final    Chloride 04/06/2018 104  100 - 108 mmol/L Final    CO2 04/06/2018 29  21 - 32 mmol/L Final    Anion gap 04/06/2018 6  3.0 - 18 mmol/L Final    Glucose 04/06/2018 71* 74 - 99 mg/dL Final    BUN 04/06/2018 15  7.0 - 18 MG/DL Final    Creatinine 04/06/2018 0.89  0.6 - 1.3 MG/DL Final    BUN/Creatinine ratio 04/06/2018 17  12 - 20   Final    GFR est AA 04/06/2018 >60  >60 ml/min/1.73m2 Final    GFR est non-AA 04/06/2018 >60 >60 ml/min/1.73m2 Final    Comment: (NOTE)  Estimated GFR is calculated using the Modification of Diet in Renal   Disease (MDRD) Study equation, reported for both  Americans   (GFRAA) and non- Americans (GFRNA), and normalized to 1.73m2   body surface area. The physician must decide which value applies to   the patient. The MDRD study equation should only be used in   individuals age 25 or older. It has not been validated for the   following: pregnant women, patients with serious comorbid conditions,   or on certain medications, or persons with extremes of body size,   muscle mass, or nutritional status.  Calcium 04/06/2018 8.4* 8.5 - 10.1 MG/DL Final    Bilirubin, total 04/06/2018 0.6  0.2 - 1.0 MG/DL Final    ALT (SGPT) 04/06/2018 33  13 - 56 U/L Final    AST (SGOT) 04/06/2018 17  15 - 37 U/L Final    Alk. phosphatase 04/06/2018 68  45 - 117 U/L Final    Protein, total 04/06/2018 7.2  6.4 - 8.2 g/dL Final    Albumin 04/06/2018 3.7  3.4 - 5.0 g/dL Final    Globulin 04/06/2018 3.5  2.0 - 4.0 g/dL Final    A-G Ratio 04/06/2018 1.1  0.8 - 1.7   Final    TSH 04/06/2018 1.42  0.36 - 3.74 uIU/mL Final    Vitamin B1 04/06/2018 196.7  66.5 - 200.0 nmol/L Final    Comment: (NOTE)  This test was developed and its performance characteristics  determined by [facility]. It has not been cleared or approved  by the Food and Drug Administration. Performed At: [address] [id number]  Abi Khan MD [id number]      Vitamin D 25-Hydroxy 04/06/2018 19.4* 30 - 100 ng/mL Final    Comment: (NOTE)  Deficiency               <20 ng/mL  Insufficiency          20-30 ng/mL  Sufficient             ng/mL  Possible toxicity       >100 ng/mL    The Method used is Siemens Advia Centaur currently standardized to a   Center of Disease Control and Prevention (CDC) certified reference   [address].  Samples containing fluorescein dye can produce falsely   elevated values when tested with the ADVIA Centaur Vitamin D Assay. It is recommended that results in the toxic range, >100 ng/mL, be   retested 72 hours post fluorescein exposure. Office Visit on 03/14/2018   Component Date Value Ref Range Status    WBC 03/14/2018 CANCELED  x10E3/uL Final-Edited    Result canceled by the ancillary    RBC 03/14/2018 CANCELED   Final-Edited    Comment: Test not performed    Result canceled by the ancillary      HGB 03/14/2018 CANCELED   Final-Edited    Comment: Test not performed    Result canceled by the ancillary      HCT 03/14/2018 CANCELED   Final-Edited    Comment: Test not performed    Result canceled by the ancillary      PLATELET 64/56/5036 CANCELED   Final-Edited    Comment: Test not performed    Result canceled by the ancillary      NEUTROPHILS 03/14/2018 CANCELED   Final-Edited    Comment: Test not performed    Result canceled by the ancillary      Lymphocytes 03/14/2018 CANCELED   Final-Edited    Comment: Test not performed    Result canceled by the ancillary      MONOCYTES 03/14/2018 CANCELED   Final-Edited    Comment: Test not performed    Result canceled by the ancillary      EOSINOPHILS 03/14/2018 CANCELED   Final-Edited    Comment: Test not performed    Result canceled by the ancillary      Abs Lymphocytes 03/14/2018 CANCELED   Final-Edited    Comment: Test not performed    Result canceled by the ancillary      ABS. EOSINOPHILS 03/14/2018 CANCELED   Final-Edited    Comment: Test not performed    Result canceled by the ancillary      ABS.  BASOPHILS 03/14/2018 CANCELED   Final-Edited    Comment: Test not performed    Result canceled by the ancillary      Glucose 03/14/2018 CANCELED  mg/dL Final-Edited    Result canceled by the ancillary    BUN 03/14/2018 CANCELED   Final-Edited    Comment: Test not performed    Result canceled by the ancillary      Creatinine 03/14/2018 CANCELED   Final-Edited    Comment: Test not performed    Result canceled by the ancillary  Sodium 03/14/2018 CANCELED   Final-Edited    Comment: Test not performed    Result canceled by the ancillary      Potassium 03/14/2018 CANCELED   Final-Edited    Comment: Test not performed    Result canceled by the ancillary      Chloride 03/14/2018 CANCELED   Final-Edited    Comment: Test not performed    Result canceled by the ancillary      CO2 03/14/2018 CANCELED   Final-Edited    Comment: Test not performed    Result canceled by the ancillary      Calcium 03/14/2018 CANCELED   Final-Edited    Comment: Test not performed    Result canceled by the ancillary      Protein, total 03/14/2018 CANCELED   Final-Edited    Comment: Test not performed    Result canceled by the ancillary      Albumin 03/14/2018 CANCELED   Final-Edited    Comment: Test not performed    Result canceled by the ancillary      Bilirubin, total 03/14/2018 CANCELED   Final-Edited    Comment: Test not performed    Result canceled by the ancillary      Alk.  phosphatase 03/14/2018 CANCELED   Final-Edited    Comment: Test not performed    Result canceled by the ancillary      AST (SGOT) 03/14/2018 CANCELED   Final-Edited    Comment: Test not performed    Result canceled by the ancillary      ALT (SGPT) 03/14/2018 CANCELED   Final-Edited    Comment: Test not performed    Result canceled by the ancillary      Cholesterol, total 03/14/2018 CANCELED  mg/dL Final-Edited    Result canceled by the ancillary    Triglyceride 03/14/2018 CANCELED   Final-Edited    Comment: Test not performed    Result canceled by the ancillary      HDL Cholesterol 03/14/2018 CANCELED   Final-Edited    Comment: Test not performed    Result canceled by the ancillary      Vitamin B12 03/14/2018 CANCELED  pg/mL Final-Edited    Result canceled by the ancillary    Folate 03/14/2018 CANCELED   Final-Edited    Comment: Test not performed    Result canceled by the ancillary      TSH 03/14/2018 CANCELED  uIU/mL Final-Edited    Result canceled by the ancillary    Vitamin B1 03/14/2018 CANCELED  nmol/L Final-Edited    Comment: This test was developed and its performance characteristics  determined by [facility]. It has not been cleared or approved  by the Food and Drug Administration. Result canceled by the ancillary      VITAMIN D, 25-HYDROXY 03/14/2018 CANCELED  ng/mL Final-Edited    Comment: Vitamin D deficiency has been defined by the [facility] practice guideline as a  level of serum 25-OH vitamin D less than 20 ng/mL (1,2). The Endocrine Society went on to further define vitamin D  insufficiency as a level between 21 and 29 ng/mL (2). 1. IOM ([place] [employer]). 2010. Dietary reference     intakes for calcium and D. [address] [facility]: The     [employer] [facility]. 2. Deangelo MF, Rosendo BARNHART, Kathy PAYTON, et al.     Evaluation, treatment, and prevention of vitamin D     deficiency: an Endocrine Society clinical practice     guideline. JCEM. 2011 Jul; 96(7):1911-30. Result canceled by the ancillary      Iron 03/14/2018 CANCELED  ug/dL Final-Edited    Result canceled by the ancillary    H. pylori Breath Test 03/14/2018 Specimen received in [facility] collection bags.   Negative Final    H. pylori Breath Test 03/14/2018 Negative  Negative Final    Ferritin 03/14/2018 CANCELED  ng/mL Final-Edited    Result canceled by the ancillary   Office Visit on 03/09/2018   Component Date Value Ref Range Status    Color (UA POC) 03/09/2018 Yellow   Final    Clarity (UA POC) 03/09/2018 Clear   Final    Glucose (UA POC) 03/09/2018 Negative  Negative Final    Bilirubin (UA POC) 03/09/2018 Negative  Negative Final    Ketones (UA POC) 03/09/2018 Negative  Negative Final    Specific gravity (UA POC) 03/09/2018 1.020  1.001 - 1.035 Final    Blood (UA POC) 03/09/2018 2+  Negative Final    pH (UA POC) 03/09/2018 6.0  4.6 - 8.0 Final    Protein (UA POC) 03/09/2018 Negative  Negative Final    Urobilinogen (UA POC) 03/09/2018 0.2 mg/dL  0.2 - 1 Final    Nitrites (UA POC) 03/09/2018 Negative  Negative Final    Leukocyte esterase (UA POC) 03/09/2018 Trace  Negative Final    PVR 03/09/2018 168  cc Final   Hospital Outpatient Visit on 03/02/2018   Component Date Value Ref Range Status    Sodium 03/02/2018 140  136 - 145 mmol/L Final    Potassium 03/02/2018 3.9  3.5 - 5.5 mmol/L Final    Chloride 03/02/2018 106  100 - 108 mmol/L Final    CO2 03/02/2018 28  21 - 32 mmol/L Final    Anion gap 03/02/2018 6  3.0 - 18 mmol/L Final    Glucose 03/02/2018 95  74 - 99 mg/dL Final    BUN 03/02/2018 13  7.0 - 18 MG/DL Final    Creatinine 03/02/2018 0.78  0.6 - 1.3 MG/DL Final    BUN/Creatinine ratio 03/02/2018 17  12 - 20   Final    GFR est AA 03/02/2018 >60  >60 ml/min/1.73m2 Final    GFR est non-AA 03/02/2018 >60  >60 ml/min/1.73m2 Final    Comment: (NOTE)  Estimated GFR is calculated using the Modification of Diet in Renal   Disease (MDRD) Study equation, reported for both  Americans   (GFRAA) and non- Americans (GFRNA), and normalized to 1.73m2   body surface area. The physician must decide which value applies to   the patient. The MDRD study equation should only be used in   individuals age 25 or older. It has not been validated for the   following: pregnant women, patients with serious comorbid conditions,   or on certain medications, or persons with extremes of body size,   muscle mass, or nutritional status.       Calcium 03/02/2018 8.4* 8.5 - 10.1 MG/DL Final   Office Visit on 02/19/2018   Component Date Value Ref Range Status    FINAL REPORT 02/19/2018 Microbiology results*  Final    Comment: ANTIBIOTIC ALLERGIES  Sulfa (Bactrim), Trimethoprim    URINE SOURCE:  Straight Cath In/Out    COLONY COUNT / RESULT:   Greater than 100,000 cfu/ml    RESULT/ORG ID:  E. coli (Isolate 1)    Sensitivity Analysis                           Isolate 1  --------------------                           ---------    AMIKACIN S  AMPICILLIN                                             R  AUGMENTIN                                              S  BACTRIM (SULFA/TRIMETHPRM)                             S  CEFAZOLIN (ANCEF)                                      R  CEFTAZIDIME                                            S  CIPRO                                                  S  GENTAMICIN                                             R  LEVAQUIN                                               S  MACRODANTN (NITROFURANTOIN                             S  ROCEPHIN (CEFTRIAXONE)                                 S  TETRACYCLINE                                                                      R  TOBRAMYCIN                                             I  ZOSYN (TAZOBACTAM/PIP)                                 S    S=Sensitive; I=Intermediate; R=Resistant      Color (UA POC) 02/19/2018 Yellow   Final    Clarity (UA POC) 02/19/2018 Cloudy   Final    Glucose (UA POC) 02/19/2018 Negative  Negative Final    Bilirubin (UA POC) 02/19/2018 Negative  Negative Final    Ketones (UA POC) 02/19/2018 Negative  Negative Final    Specific gravity (UA POC) 02/19/2018 1.020  1.001 - 1.035 Final    Blood (UA POC) 02/19/2018 3+  Negative Final    moderate    pH (UA POC) 02/19/2018 5.0  4.6 - 8.0 Final    Protein (UA POC) 02/19/2018 Negative  Negative Final    Urobilinogen (UA POC) 02/19/2018 0.2 mg/dL  0.2 - 1 Final    Nitrites (UA POC) 02/19/2018 Positive  Negative Final    Leukocyte esterase (UA POC) 02/19/2018 2+  Negative Final   Office Visit on 02/03/2018   Component Date Value Ref Range Status    Color (UA POC) 02/03/2018 Yellow   Final    Clarity (UA POC) 02/03/2018 Clear   Final    Glucose (UA POC) 02/03/2018 Negative  Negative Final    Bilirubin (UA POC) 02/03/2018 Negative  Negative Final    Ketones (UA POC) 02/03/2018 Negative  Negative Final    Specific gravity (UA POC) 02/03/2018 1.020  1.001 - 1.035 Final    Blood (UA POC) 02/03/2018 3+  Negative Final    pH (UA POC) 02/03/2018 5.0  4.6 - 8.0 Final    Protein (UA POC) 02/03/2018 Negative  Negative Final    Urobilinogen (UA POC) 02/03/2018 0.2 mg/dL  0.2 - 1 Final    Nitrites (UA POC) 02/03/2018 Negative  Negative Final    Leukocyte esterase (UA POC) 02/03/2018 Trace  Negative Final    FINAL REPORT 02/03/2018 Microbiology results   Final    Comment: ANTIBIOTIC ALLERGIES  Sulfa (Bactrim)    URINE SOURCE:  Clean Catch    COLONY COUNT / RESULT:   10,000 cfu/ml Mixed Urogenital / Skin [place]         TESTS  Results for orders placed or performed in visit on 04/27/18   CULTURE, URINE   Result Value Ref Range    Urine Culture, Routine (A)      Escherichia coli  10,000-25,000 colony forming units per mL         Susceptibility    Escherichia coli -  (no method available)*     Amoxicillin/Clavulanic A S Susceptible ug/mL     Ampicillin ($) I Intermediate ug/mL     Cefepime ($$) S Susceptible ug/mL     Ceftriaxone ($) S Susceptible ug/mL     Cefuroxime ($) I Intermediate ug/mL     Cephalothin R Resistant ug/mL     Ciprofloxacin ($) R Resistant ug/mL     Ertapenem ($$$$) S Susceptible ug/mL     Gentamicin ($) S Susceptible ug/mL     Imipenem S Susceptible ug/mL     Levofloxacin ($) R Resistant ug/mL     Nitrofurantoin S Susceptible ug/mL     Piperacillin S Susceptible ug/mL     Tetracycline S Susceptible ug/mL     Tobramycin ($) S Susceptible ug/mL     Trimeth-Sulfamethoxa S Susceptible ug/mL     * Performed at:  [address]  [id number] Director: Heather Coats MD, Phone:  [phone]   AMB POC URINALYSIS DIP STICK AUTO W/O MICRO   Result Value Ref Range    Color (UA POC) Yellow     Clarity (UA POC) Clear     Glucose (UA POC) Negative Negative    Bilirubin (UA POC) Negative Negative    Ketones (UA POC) Negative Negative    Specific gravity (UA POC) 1.020 1.001 - 1.035    Blood (UA POC) 2+ Negative    pH (UA POC) 7.0 4.6 - 8.0    Protein (UA POC) Negative Negative    Urobilinogen (UA POC) 0.2 mg/dL 0.2 - 1    Nitrites (UA POC) Negative Negative    Leukocyte esterase (UA POC) Trace Negative     Assessment/Plan:      1. Dysuria - monitor for now. - AMB POC URINALYSIS DIP STICK AUTO W/O MICRO negative except blood which is baseline for patient. 2. Screening for cervical cancer  - PAP IG, APTIMA HPV AND RFX 16/18,45 ([id number]); Future    Lab review: orders written for new lab studies as appropriate; see orders    I have discussed the diagnosis with the patient and the intended plan as seen in the above orders. The patient has received an after-visit summary and questions were answered concerning future plans. I have discussed medication side effects and warnings with the patient as well. I have reviewed the plan of care with the patient, accepted their input and they are in agreement with the treatment goals. Follow-up Disposition:  Return in about 1 year (around 5/26/2019), or if symptoms worsen or fail to improve.     Karen Juarez MD

## 2018-05-29 ENCOUNTER — HOSPITAL ENCOUNTER (OUTPATIENT)
Dept: LAB | Age: 43
Discharge: HOME | End: 2018-05-29
Payer: COMMERCIAL

## 2018-05-29 PROCEDURE — 87798 DETECT AGENT NOS DNA AMP: CPT | Performed by: FAMILY MEDICINE

## 2018-05-30 LAB
BILIRUB UR QL STRIP: NEGATIVE
GLUCOSE UR-MCNC: NEGATIVE MG/DL
KETONES P FAST UR STRIP-MCNC: NEGATIVE MG/DL
PH UR STRIP: 6.5 [PH] (ref 4.6–8)
PROT UR QL STRIP: NEGATIVE
SP GR UR STRIP: 1.02 (ref 1–1.03)
UA UROBILINOGEN AMB POC: NORMAL (ref 0.2–1)
URINALYSIS CLARITY POC: CLEAR
URINALYSIS COLOR POC: YELLOW
URINE BLOOD POC: NORMAL
URINE LEUKOCYTES POC: NEGATIVE
URINE NITRITES POC: NEGATIVE

## 2018-06-01 ENCOUNTER — TELEPHONE (OUTPATIENT)
Dept: FAMILY MEDICINE CLINIC | Age: 43
End: 2018-06-01

## 2018-06-01 NOTE — TELEPHONE ENCOUNTER
----- Message from Latoya Castellanos MD sent at 6/1/2018  7:34 AM EDT -----  Pls report normal pap. Routine F/U.

## 2018-06-01 NOTE — PROGRESS NOTES
Pts recent lab/imaging results printed and mailed to home address.  Thank you    Earnestine Irving LPN

## 2018-06-25 ENCOUNTER — OFFICE VISIT (OUTPATIENT)
Dept: SURGERY | Age: 43
End: 2018-06-25

## 2018-06-25 VITALS
TEMPERATURE: 97.4 F | BODY MASS INDEX: 40.85 KG/M2 | SYSTOLIC BLOOD PRESSURE: 123 MMHG | HEART RATE: 79 BPM | DIASTOLIC BLOOD PRESSURE: 81 MMHG | HEIGHT: 62 IN | WEIGHT: 222 LBS | RESPIRATION RATE: 20 BRPM

## 2018-06-25 DIAGNOSIS — E66.01 MORBID OBESITY (HCC): Primary | ICD-10-CM

## 2018-06-25 NOTE — PATIENT INSTRUCTIONS
If you have any questions or concerns about today's appointment, the verbal and/or written instructions you were given for follow up care, please call our office at [phone].     [facility] - [address]    [phone] office  [phone]

## 2018-06-25 NOTE — PROGRESS NOTES
Bariatric Preoperative Progress note:    Subjective:     Dionne Burger is a 37 y.o. female who presents today for followup of their candidacy for bariatric surgery. Since last seen, Dionne Burger has been working through bariatric program towards [facility]. Weight Loss Metrics 2018   Pre op / Initial Wt 237 lb    Today's Wt 222 lb   BMI 40.6 kg/m2   Ideal Body Wt 125   Excess Body Wt 112   Wt loss to date 15   % Wt Loss 0   80% EBW 89.6       Past Medical History:   Diagnosis Date    Anxiety     Dysfunctional voiding of urine     Microhematuria     Pelvic pain     Psychiatric disorder     depression/  anxiety    Recurrent UTI     Sleep apnea     Solitary kidney, acquired     Stress incontinence        Past Surgical History:   Procedure Laterality Date    HX  SECTION      HX CHOLECYSTECTOMY      HX HYSTERECTOMY      partial hysterectomy 2/2 painful menses    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     HX UROLOGICAL      Interstem stimulation device       Current Outpatient Prescriptions   Medication Sig Dispense Refill    acetaminophen-codeine (TYLENOL #3) 300-30 mg per tablet       ALPRAZolam (XANAX) 1 mg tablet Take 1 Tab by mouth as needed. Max Daily Amount: 1 mg. 30 Tab 2       Allergies   Allergen Reactions    Ibuprofen Other (comments)     Patient has 1 kidney and can not take Ibuprofen    Morphine Other (comments)     GI DISTRESS       Sulfa (Sulfonamide Antibiotics) Other (comments)     GI DISTRESS     Sulfamethoxazole-Trimethoprim Unknown (comments)    Vicodin [Hydrocodone-Acetaminophen] Other (comments)     Psychological Reaction          ROS:  Review of Systems   Constitutional: Negative. HENT: Negative. Respiratory: Negative. Cardiovascular: Negative. Gastrointestinal: Negative. Genitourinary:        GARRETT   Musculoskeletal: Negative.     Skin: Negative. Neurological: Negative. Objective:     Physical Exam:  Visit Vitals    /81 (BP 1 Location: Left arm, BP Patient Position: At rest)    Pulse 79    Temp 97.4 °F (36.3 °C) (Oral)    Resp 20    Ht 5' 2\" (1.575 m)    Wt 100.7 kg (222 lb)    BMI 40.6 kg/m2       Physical Exam   Constitutional: She is oriented to person, place, and time and well-developed, well-nourished, and in no distress. HENT:   Head: Normocephalic. Cardiovascular: Normal rate. Pulmonary/Chest: Effort normal.   Abdominal: Soft. Musculoskeletal: Normal range of motion. Neurological: She is alert and oriented to person, place, and time. Skin: Skin is warm and dry. Psychiatric: Affect normal.       Studies to date:    Labs: 4/6/18 reviewed with pt, significant for Vit D 19.4    EKG: NSR     Nutritional evaluation:4/6 WLT, due to finish in Aug     Psychiatric evaluation: approved     Support group: planned for July     Assessment:   Gill Coppola is a 37 y.o. female who is progressing through the bariatric preoperative evaluation. At this time, they are an appropriate candidate for weight loss surgery. Plan:   -complete remainder of preop evaluation including WLT and support group.   -continue 5,000u Vit D3   -Pt communicates understanding that the expectation is to loseweight during WLT. Weight gain may result in delaying surgery.   -Follow up once has completed entirety of weight loss workup to determine next steps.          >50% of 15 min visit spent counseling     FAIZA Plunkett-BC

## 2018-06-25 NOTE — PROGRESS NOTES
Florence Deras is a 37 y.o. female who presents today with   Chief Complaint   Patient presents with    Morbid Obesity     Mid trial                1. Have you been to the ER, urgent care clinic since your last visit? Hospitalized since your last visit? No    2. Have you seen or consulted any other health care providers outside of the [address] [place] [address] since your last visit? Include any pap smears or colon screening.  No

## 2018-06-25 NOTE — MR AVS SNAPSHOT
[facility] 
 
 
 [address] [place] [address] [address] [id number] 
[phone] Patient: Christin Ortega MRN: [id number] ZIL:3/17/1924 Visit Information Date & Time Provider Department Dept. Phone Encounter #  
 6/25/2018  9:30 AM Henry Ballard NP [facility] [phone] [id number] Your Appointments 6/25/2018  9:30 AM  
Follow Up with MARK Talley ([facility]) Appt Note: mid trail apt [facility]GBP; Pt r/s; Pt r/s; R/S appointment from Dr. Adela Steven to [facility]; mid trail apt [facility]; Pt has appointment with Daughter; Pt confirmed appointment; $35 cp/ reviewed. .../[facility]  
 [id number] [place] [address] [address] 88 710 [facility] 951 7/13/2018  9:15 AM  
ESTABLISHED PATIENT with Terrell Khanna MD  
[facility] [address] ([address] [place] [address]) Appt Note: Return in about 4 months (around 7/9/2018) for OAB, Recurrent UTI  
 [address] [place] [id number]  
  
    
 7/18/2018  3:00 PM  
NUTRITION COUNSELING with [facility] DIETICIAN Barak Farooq ([address] [place] [address]) Appt Note: 5 of 6 [address] 222 [address] 88 710 [facility] 951 8/16/2018 10:30 AM  
NUTRITION COUNSELING with [facility] DIETICIAN Barak Farooq ([address] [place] [address]) Appt Note: 6 of 6 [address] [id number]  
[phone] Upcoming Health Maintenance Date Due DTaP/Tdap/Td series (1 - Tdap) 4/30/1994 Influenza Age 5 to Adult 8/1/2018 BREAST CANCER SCRN MAMMOGRAM 12/7/2019 PAP AKA CERVICAL CYTOLOGY 5/26/2021 Allergies as of 6/25/2018  Review Complete On: 5/26/2018 By: Danilo Castro LPN Severity Noted Reaction Type Reactions Ibuprofen  03/09/2018   Not Verified Other (comments) Patient has 1 kidney and can not take Ibuprofen Morphine  09/21/2016    Other (comments) GI DISTRESS Sulfa (Sulfonamide Antibiotics)  09/21/2016    Other (comments) GI DISTRESS Sulfamethoxazole-trimethoprim  01/01/2017    Unknown (comments) Vicodin [Hydrocodone-acetaminophen]  09/21/2016    Other (comments) Psychological Reaction Current Immunizations  Never Reviewed No immunizations on file. Not reviewed this visit Vitals BP Pulse Temp Resp Height(growth percentile) Weight(growth percentile) 123/81 (BP 1 Location: Left arm, BP Patient Position: At rest) 79 97.4 °F (36.3 °C) (Oral) 20 5' 2\" (1.575 m) 222 lb (100.7 kg) BMI OB Status Smoking Status 40.6 kg/m2 Hysterectomy Never Smoker BMI and BSA Data Body Mass Index Body Surface Area  
 40.6 kg/m 2 2.1 m 2 Preferred Pharmacy Pharmacy Name Phone [address] [phone] Your Updated Medication List  
  
   
This list is accurate as of 6/25/18  9:20 AM.  Always use your most recent med list.  
  
  
  
  
 acetaminophen-codeine 300-30 mg per tablet Commonly known as:  TYLENOL #3  
  
 ALPRAZolam 1 mg tablet Commonly known as:  Lakshmi Luis M Take 1 Tab by mouth as needed. Max Daily Amount: 1 mg. Patient Instructions If you have any questions or concerns about today's appointment, the verbal and/or written instructions you were given for follow up care, please call our office at [phone]. [facility] [id number] [place] [address], [address] [facility], [facility] [address] 
 
[phone] office [phone] Introducing [facility]! Dear Neelam Del Rosario: Thank you for requesting a [employer]OMi account. Our records indicate that you already have an active AOMi account. You can access your account anytime at [email or web address] [email or web address] Did you know that you can access your hospital and ER discharge instructions at any time in [employer]OMi? You can also review all of your test results from your hospital stay or ER visit. Additional Information If you have questions, please visit the Frequently Asked Questions section of the AOMi website at [email or web address] Remember, AOMi is NOT to be used for urgent needs. For medical emergencies, dial 911. Now available from your iPhone and Android! Please provide this summary of care documentation to your next provider. Your primary care clinician is listed as Avinash Small. If you have any questions after today's visit, please call [phone].

## 2018-06-27 ENCOUNTER — DOCUMENTATION ONLY (OUTPATIENT)
Dept: SURGERY | Age: 43
End: 2018-06-27

## 2018-06-28 NOTE — PROGRESS NOTES
[place] [address] [facility]  [address] [facility]    Patient's Name: Royal Vanegas                                  Age: 37 y.o. YOB: 1975                                          Sex: female  Date: 06/21/2018  Insurance: [facility]                          Session: 4 of 6               Surgeon:  Dr. Alana Kurtz     Height: 5'2\"                    Weight: 224#           Starting weight with surgeon: 227#          Do you smoke?: no    Alcohol Intake:   I do not drink at all:x      Class Guidelines    Pt. Understand that if they miss a month, depending on insurance, they may have to start over. Pt. Also understand that the expectation is to lose  Or maintain weight. Weight gain may result in delaying surgery until the weight is off. EATING HABITS AND BEHAVIORS:  Pt. Struggles With:  Liquid calories:   Skipping breakfast: x  Eating foods with a high amount of carbohydrates:   Eating High fat foods:   Eating large portions:   Making poor snack choices:  Eating out frequently:   Skipping meals: x  Reading labels:   Drinking >48 oz fluids daily:   Getting sufficient physical activity/exercise:   Night time eating/snacking:   Binge eating:   Eating often, even when not hungry (grazing):   Giving into peer pressure regarding eating/drinking:   Other:     Each class we spend time discussing the pre-op diet, diet progression and vitamin/mineral requirements as well as the Key Diet Principles. Each class we also cover lowering carbohydrate consumption. Keeping carbohydrates <25 grams per meal and avoiding added sugars is emphasized. Patient is educated on the effects that  carbohydrates and bad fats have on them. PHYSICAL ACTIVITY/EXERCISE:   Patient's activity is increasing because patient plans to or is:  Walking more: 30  Other aerobic activity such as running, swimming, [place], kickboxing ect. : x  Chair exercises:    Active in resistance training such as weight lifting:   Other:     Each class we spend time discussing the importance of increasing activity. Patient can start with 10 minutes of walking daily or chair exercises and build from there. BEHAVIOR MODIFICATION:  Making modifications to behavior, patient plans to or is:  Eating only when hungry not to treat stress, anger, tiredness or boredom: x  Eating away from TV, computer and phone: x  Eating on a small plate: x  Eats slowly, chewing food well: x  Other: We spend time in each class discussing the importance of breaking bad habits and how to do this. I encourage pt.s to self evaluate and look for those crucial moments in which they are making these poor choices. I recommend a food journal which can help identify when/where//why/who we are with when we compromise and make exceptions that are poor choices. ADDITIONAL INFORMATION:  Specific changes patient made since the last class:  [address]. Going at least 3 times per week.       Idalmis Eric, GONZALO  06/21/2018

## 2018-07-09 ENCOUNTER — TELEPHONE (OUTPATIENT)
Dept: SURGERY | Age: 43
End: 2018-07-09

## 2018-07-09 ENCOUNTER — DOCUMENTATION ONLY (OUTPATIENT)
Dept: SURGERY | Age: 43
End: 2018-07-09

## 2019-06-13 PROBLEM — R06.83 SNORING: Status: ACTIVE | Noted: 2017-11-15

## 2020-01-27 PROBLEM — R13.10 DYSPHAGIA: Status: ACTIVE | Noted: 2019-06-25

## 2020-01-27 PROBLEM — N39.0 URINARY TRACT INFECTIOUS DISEASE: Status: ACTIVE | Noted: 2019-07-31

## 2020-01-27 PROBLEM — M25.551 BILATERAL HIP PAIN: Status: ACTIVE | Noted: 2019-10-18

## 2020-01-27 PROBLEM — M25.552 BILATERAL HIP PAIN: Status: ACTIVE | Noted: 2019-10-18

## 2020-01-27 PROBLEM — N30.10 CHRONIC INTERSTITIAL CYSTITIS: Status: ACTIVE | Noted: 2019-07-31

## 2020-01-27 PROBLEM — B96.89 BACTERIAL VAGINOSIS: Status: ACTIVE | Noted: 2019-07-31

## 2021-07-12 PROBLEM — N30.10 CHRONIC INTERSTITIAL CYSTITIS: Status: RESOLVED | Noted: 2019-07-31 | Resolved: 2021-07-12

## 2022-03-13 NOTE — PROGRESS NOTES
Nurse note from patient's weekly VLCD / LCD / Maintenance class was reviewed.   Pertinent medical concerns were:  none
Progress Note: Weekly Medical Monitoring in the [facility]    Is there anything that you or the patient needs to let the supervising provider know about? no    Over the past week, have you experienced any side-effects? Yes, lightheadedness. Myke Gonzalez is a 38 y.o. female who is enrolled in [facility]    Myke Gonzalez was prescribed the VLCD / LCD. Visit Vitals    /67    Pulse 77    Ht 5' 2\" (1.575 m)    Wt 222 lb 3.2 oz (100.8 kg)    BMI 40.64 kg/m2     Weight Metrics 6/23/2017 6/16/2017 6/16/2017 6/9/2017 5/12/2017 5/9/2017 5/5/2017   Weight 222 lb 3.2 oz - 227 lb 9.6 oz 226 lb 9.6 oz 225 lb 6.4 oz 226 lb 223 lb   Waist Measure Inches 47.5 - - - - - -   Body Fat % - 41.6 - - - - -   BMI 40.64 kg/m2 - 41.63 kg/m2 41.45 kg/m2 41.23 kg/m2 41.34 kg/m2 40.78 kg/m2         Have you received any other medical care this week? no  If yes, where and for what? Have you had any change in your medications since your last visit? no  If yes what? Did you have any problems adhering to the program last week? yes  If yes, please explain: Miss real food. Eating Habits Over Last Week:  Did you take in 64 oz of non-caloric fluids? yes     Did you consume your prescribed meal replacement regimen each day?  yes       Physical Activity Over the Past Week:    Aerobic exercise: 0 min  Resistance exercise: 0 workouts / week\
Statement Selected
6

## 2022-04-06 ENCOUNTER — OFFICE VISIT (OUTPATIENT)
Dept: SURGERY | Age: 47
End: 2022-04-06

## 2022-04-06 VITALS — HEIGHT: 63 IN | WEIGHT: 236.4 LBS | BODY MASS INDEX: 41.89 KG/M2

## 2022-04-06 DIAGNOSIS — E66.01 MORBID OBESITY (HCC): Primary | ICD-10-CM

## 2022-04-06 NOTE — PROGRESS NOTES
[employer] [facility]  Multidisciplinary Weight Loss    Date: 2022    Name: Saravanan Yanez   : 1975    Session# 1. Pt attended a 90 minute in-person class. Topics reviewed include pre-op key diet principles, behavior modification techniques, and physical activity/exercise guidelines both pre- and post-operatively. Pt completed a bariatric-specific questionnaire, including a diet history, physical activity summary, and responses to additional nutrition-related questions. Pt submitted bariatric quiz, which requires at least an 80% pass-rate for surgical approval from RD. Weight obtained in office. Visit [facility]   5' 3\" (1.6 m)   Wt 107.2 kg (236 lb 6.4 oz)   BMI 41.88 kg/m²     Patient attended seminar and has not been seen by provider for initial consult, which is scheduled with Dr. Irina Peace on 22. Dietary Instructions     Reviewed intake   Understanding low carbohydrates, low sugar, higher protein meals   Instruction given for personal dietary changes   Discussed perceived compliance    Comments:  Recommended dietary changes discussed for both before and after surgery. Recommendation to follow 1200 calorie diet, working to reduce total carbohydrate intake to  g or less per day and increasing protein intake to  g per day, with no liquids at mealtimes (for preparation of post-op 30:30 rule recommendation), and 64+ oz  no sugar, no caffeine, no carbonation fluids per day. Discussed importance of sampling protein supplements, protein supplement label guidelines and popularly selected items. Educated pt on the importance of eating 3 meals/day at regularly scheduled times, including breakfast within the first 1-2 hours of waking, as well as working towards consuming 4-6 smaller meals daily to assist with optimizing protein intake and absorption.  Suggested patient use a protein supplement as a meal replacement, instead of skipping meals, OR as a high-protein snack between meals. Also provided education on the importance of including a protein source with every meal and snack, and reviewed lean sources of proteins. Introduced the Plate Method for meal planning and discussed the importance of reducing and limiting daily carbohydrate intake. Recommended carbohydrate intake amounts are  g/d now with an ultimate goal of 30-50 g/d pre-operatively and a very low carbohydrate content post-operatively. Discussed label reading, tips for measuring carbohydrates, and low carbohydrate/high protein meal and snack ideas. Behavior Modification     Identify obstacles to trigger change   Achieving/Rewarding goals met   Positive attitude   Reinforced the importance of modifying lifestyle patterns and behaviors to promote weight loss and long-term weight maintenance    Comments:  Discussed key diet principles. Reviewed dietary and behavior changes to begin making now. Discussed the importance of taking vitamin/mineral supplements post-op, and we reviewed the vitamins/minerals that patients will be taking post-op. Encouraged pt to begin taking multivitamin and probiotic now in preparation for surgery. RD reviewed questionnaire responses and provided positive feedback on responses that align with bariatric recommendations for behavior changes. Also provided feedback on areas for improvement. All current stated pt questions answered. Physical Activity/Exercise     Motivation   Discussed Perceived Compliance    Comments:  Pt has been educated on the importance of starting a physical activity regimen. Reinforced the importance of engaging in regular physical activity for weight management and overall health. Recommend pt adopt an exercise routine of at least 3-5 d/wk for at least 30 min/d.   If pt is unable to participate in moderate intensity exercises such as walking, puneet, or swimming, recommend pt work with a physical therapist and/or participate in chair exercises, yoga, and/or increase activities of daily living as able. Encouraged pt to set and keep weekly exercise goals. Handouts provided:    Bariatric Surgery Criteria\" Packet   Overview of Vitamins & Minerals     Post-Op Education\" Packet   Meal Guide Packet   BD Starches Guide   Nuts for Nuts? Be Careful!    Protein Chart   Protein Content of Foods   Evon Products Counting\"   \"Snack Suggestions\"    Goals:   1. Work to increase to 3-4 small meals per day, with planned snacks as needed. Recommend following plate method for meal planning - focusing on lean protein, non-starchy vegetables, and measured amounts of starch. - Goal of  g protein and  g carbohydrate per day. - Recommend continuing protein supplement as meal replacement at least 1x/day OR as high protein snack option  2. Increase non caloric fluid to 64 oz per day. Eliminate caffeine, added sugar, carbonation, and straws.               -Continue to work to decrease sugar sweetened beverages - goal of calorie free beverages only              -Must eliminate caffeine prior to surgery and avoid for ~6-8 weeks   -Practice 30:30 rule,  food and fluid intake   3. Start activity regimen, work to increase ADL  4. Start Complete MVI    Candidate for surgery (per RD): YES - Pt has met insurance requirements for pre-operative nutrition education. Pt acknowledges understanding that bariatric surgery requires lifelong adherence to dietary and exercise behavior change recommendations in order to be successful with weight loss and long-term weight maintenance once weight loss goal is met. Pt demonstrates understanding of post-op key diet principles and recommendations through class discussion and recall. Patient passed bariatric surgery nutrition quiz with at least 80% pass rate. Patient's questions/concerns addressed. RD contact information provided for future nutrition questions or concerns.      [x] E-mail sent to pt with monthly support group information.     Melchor Wick RD

## 2022-04-18 ENCOUNTER — OFFICE VISIT (OUTPATIENT)
Dept: SURGERY | Age: 47
End: 2022-04-18
Payer: COMMERCIAL

## 2022-04-18 VITALS
HEART RATE: 88 BPM | WEIGHT: 229.8 LBS | OXYGEN SATURATION: 100 % | SYSTOLIC BLOOD PRESSURE: 128 MMHG | HEIGHT: 63 IN | TEMPERATURE: 96.9 F | BODY MASS INDEX: 40.72 KG/M2 | DIASTOLIC BLOOD PRESSURE: 74 MMHG | RESPIRATION RATE: 16 BRPM

## 2022-04-18 DIAGNOSIS — G47.33 OSA ON CPAP: ICD-10-CM

## 2022-04-18 DIAGNOSIS — G47.30 SLEEP APNEA, UNSPECIFIED TYPE: ICD-10-CM

## 2022-04-18 DIAGNOSIS — J45.909 UNCOMPLICATED ASTHMA, UNSPECIFIED ASTHMA SEVERITY, UNSPECIFIED WHETHER PERSISTENT: ICD-10-CM

## 2022-04-18 DIAGNOSIS — E66.01 MORBID OBESITY WITH BODY MASS INDEX (BMI) OF 40.0 TO 49.9 (HCC): ICD-10-CM

## 2022-04-18 DIAGNOSIS — R13.10 DYSPHAGIA, UNSPECIFIED TYPE: ICD-10-CM

## 2022-04-18 DIAGNOSIS — K44.9 HIATAL HERNIA: ICD-10-CM

## 2022-04-18 DIAGNOSIS — Z90.5 SOLITARY KIDNEY, ACQUIRED: ICD-10-CM

## 2022-04-18 DIAGNOSIS — Z99.89 OSA ON CPAP: ICD-10-CM

## 2022-04-18 DIAGNOSIS — K30 FUNCTIONAL DYSPEPSIA: ICD-10-CM

## 2022-04-18 DIAGNOSIS — E66.01 MORBID OBESITY (HCC): Primary | ICD-10-CM

## 2022-04-18 PROBLEM — R06.83 SNORING: Status: RESOLVED | Noted: 2017-11-15 | Resolved: 2022-04-18

## 2022-04-18 PROCEDURE — 99205 OFFICE O/P NEW HI 60 MIN: CPT | Performed by: SPECIALIST

## 2022-04-18 RX ORDER — CEPHALEXIN 500 MG/1
CAPSULE ORAL
COMMUNITY
Start: 2022-04-09 | End: 2022-10-10 | Stop reason: ALTCHOICE

## 2022-04-18 NOTE — PROGRESS NOTES
Bariatric Surgery Consultation    Subjective: The patient is a 48 y.o. obese female with a Body mass index is 40.71 kg/m². .  The patient is currently her heaviest weight for the past several years. she has been overweight since early adulthood. she has been considering surgery since last year. she desires surgery at this time because of multiple health concerns and their lifestyle issues which are hindered by their weight. she has been referred by [facility] for evaluation and treatment of their obesity via surgical intervention. Margaret Crump has tried multiple diets in her lifetime most recently tried behavior modification and unsupervised diets     Bariatric comorbidities present are   Patient Active Problem List   Diagnosis Code    GARRETT (stress urinary incontinence, female) N39.3    Urinary incontinence without sensory awareness N39.42    Solitary RIGHT kidney, acquired (hx donor left kidney) Z90.5    History of recurrent UTIs Z87.440    Psychiatric disorder F99    BMI 40.0-44.9, adult ([facility] [address] Z68.41    Anxiety F41.9    Incomplete bladder emptying R33.9    Morbid obesity ([facility] [address] E66.01    MELISSA on CPAP G47.33, Z99.89    Behaviorally induced insufficient sleep syndrome F51.12    Chronic fatigue R53.82    Snoring R06.83    Bacterial vaginosis N76.0, B96.89    Bilateral hip pain M25.551, M25.552    Dysphagia R13.10    Muscle weakness (generalized) M62.81    Sciatica of right side M54.31    OAB (overactive bladder) N32.81    Morbid obesity with body mass index (BMI) of 40.0 to 49.9 ([facility]) E66.01    Sleep apnea G47.30    Asthma J45.909    Functional dyspepsia K30    Hiatal hernia K44.9    Interstitial cystitis N30.10       The patient is considering laparoscopic gastric bypass surgery for surgical weight loss due to their ineffective progress with medical forms of weight loss and the urging of their physician who cares for their primary medical issues.  The patient  now presents  for consideration for weight loss surgery understanding the benefits of this over a medical approach of weight loss as was discussed in our presentation on weight loss surgery. They have discussed their plans both with their family and primary care physician who is in support of their pursuit of such. The patient has had no health issues as of late and denies and gastrointestinal disturbances other than what is outlined below in their review of symptoms. All of their prior evaluations available by both their PCP's and specialists physicians have been reviewed today either in the Care Everywhere portal or scanned under the media tab. I have spent a large portion of my initial consultation today reviewing the patients current dietary habits which have contributed to their health issues and obesity. I have suggested to them personally a dietary regimen that they can initiate now to help with their status as it pertains to their weight. They understand that the most important aspect of their journey through their weight loss endeavor will be their adherence to a new lifestyle of healthy eating behavior. They also understand that an adherence to an exercise program will not only help with weight loss but is ultimately important in weight maintenance. The patients goal weight is 152 lb.        Patient Active Problem List    Diagnosis Date Noted    OAB (overactive bladder) 07/12/2021    Morbid obesity with body mass index (BMI) of 40.0 to 49.9 ([facility])     Sleep apnea     Asthma     Functional dyspepsia     Hiatal hernia     Interstitial cystitis     Bilateral hip pain 10/18/2019    Muscle weakness (generalized) 10/18/2019    Sciatica of right side 10/18/2019    Bacterial vaginosis 07/31/2019    Dysphagia 06/25/2019    Morbid obesity ([facility] [address] 03/14/2018    MELISSA on CPAP 03/14/2018    Incomplete bladder emptying     Behaviorally induced insufficient sleep syndrome 11/15/2017    Chronic fatigue 11/15/2017    Snoring 11/15/2017    BMI 40.0-44.9, adult ([facility] [address] 2017    Anxiety 2017    Urinary incontinence without sensory awareness     Solitary RIGHT kidney, acquired (hx donor left kidney)     History of recurrent UTIs     Psychiatric disorder     GARRETT (stress urinary incontinence, female)       Past Surgical History:   Procedure Laterality Date    HX  SECTION      HX CHOLECYSTECTOMY      HX HYSTERECTOMY      partial     HX NEPHRECTOMY Left 2000    Donated left kidney    HX TUBAL LIGATION      HX UROLOGICAL      removal of Interstem stimulation device    HX UROLOGICAL      Interstem stimulation device      Social History     Tobacco Use    Smoking status: Never Smoker    Smokeless tobacco: Never Used   Substance Use Topics    Alcohol use: Yes     Alcohol/week: 1.0 standard drink     Types: 1 Shots of liquor per week     Comment:  rarely drink   Q 3 mos. Family History   Problem Relation Age of Onset    Elevated Lipids Mother     Hypertension Mother     No Known Problems Father       Current Outpatient Medications   Medication Sig Dispense Refill    CHOLECALCIFEROL, VITAMIN D3, PO Take 1,000 Int'l Units by mouth daily.  cephALEXin (KEFLEX) 500 mg capsule TAKE 1 CAPSULE BY MOUTH FOUR TIMES A DAY FOR 7 DAYS      VENTOLIN HFA 90 mcg/actuation inhaler       acetaminophen-codeine (TYLENOL #3) 300-30 mg per tablet       ALPRAZolam (XANAX) 1 mg tablet Take 1 Tab by mouth as needed.  Max Daily Amount: 1 mg. 30 Tab 2     Allergies   Allergen Reactions    Chlorhexidine Rash and Itching     ?allergy to skin prep during surgery    Ibuprofen Other (comments)     Patient has 1 kidney and can not take Ibuprofen    Morphine Other (comments)     GI DISTRESS       Sulfa (Sulfonamide Antibiotics) Other (comments)     GI DISTRESS     Sulfamethoxazole-Trimethoprim Unknown (comments)    Vicodin [Hydrocodone-Acetaminophen] Other (comments)     Psychological Reaction           Review of Systems:        General - No history or complaints of unexpected fever, chills, or weight loss  Head/Neck - No history or complaints of headache, diplopia, dysphagia, hearing loss  Cardiac - No history or complaints of chest pain, palpitations, murmur, or shortness of breath  Pulmonary - No history or complaints of shortness of breath, productive cough, hemoptysis  Gastrointestinal - mild reflux noted which is controlled with diet and OTC meds, no abdominal pain, obstipation/constipation or blood per rectum  Genitourinary - No history or complaints of hematuria/dysuria, stress urinary incontinence symptoms, or renal lithiasis  Musculoskeletal - mild joint pain in their knees and fooet,  no muscular weakness  Hematologic - No history or complaints of bleeding disorders,  No blood transfusions  Neurologic - No history or complaints of  migraine headaches, seizure activity, syncopal episodes, TIA or stroke  Integumentary - No history or complaints of rashes, abnormal nevi, skin cancer  Gynecological - unremarkable    Objective:     Visit Vitals  /74   Pulse 88   Temp 96.9 °F (36.1 °C)   Resp 16   Ht 5' 3\" (1.6 m)   Wt 104.2 kg (229 lb 12.8 oz)   SpO2 100%   BMI 40.71 kg/m²       Physical Examination: General appearance - alert, well appearing, and in no distress  Mental status - alert, oriented to person, place, and time  Eyes - pupils equal and reactive, extraocular eye movements intact  Nose - normal and patent, no erythema, discharge or polyps  Mouth - mucous membranes moist, pharynx normal without lesions  Neck - supple, no significant adenopathy  Lymphatics - no palpable lymphadenopathy, no hepatosplenomegaly  Chest - clear to auscultation, no wheezes, rales or rhonchi, symmetric air entry  Heart - normal rate, regular rhythm, normal S1, S2, no murmurs, rubs, clicks or gallops  Abdomen - soft, nontender, nondistended, no masses or organomegaly  Back exam - full range of motion, no tenderness, palpable spasm or pain on motion  Neurological - alert, oriented, normal speech, no focal findings or movement disorder noted  Musculoskeletal - no joint tenderness, deformity or swelling  Extremities - peripheral pulses normal, no pedal edema, no clubbing or cyanosis  Skin - normal coloration and turgor, no rashes, no suspicious skin lesions noted    Labs:     Lab Results   Component Value Date/Time    WBC 10.0 07/21/2018 09:58 AM    HGB 14.1 07/21/2018 09:58 AM    HCT 41.4 07/21/2018 09:58 AM    PLATELET 288 11/55/6060 09:58 AM    MCV 87 07/21/2018 09:58 AM     Lab Results   Component Value Date/Time    Sodium 144 07/21/2018 09:58 AM    Potassium 4.2 07/21/2018 09:58 AM    Chloride 103 07/21/2018 09:58 AM    CO2 25 07/21/2018 09:58 AM    Anion gap 6 04/06/2018 08:43 AM    Glucose 85 07/21/2018 09:58 AM    BUN 11 07/21/2018 09:58 AM    Creatinine 0.90 07/21/2018 09:58 AM    BUN/Creatinine ratio 12 07/21/2018 09:58 AM    GFR est AA 89 07/21/2018 09:58 AM    GFR est non-AA 77 07/21/2018 09:58 AM    Calcium 9.2 07/21/2018 09:58 AM    Bilirubin, total 0.6 04/06/2018 08:43 AM    Alk. phosphatase 68 04/06/2018 08:43 AM    Protein, total 7.2 04/06/2018 08:43 AM    Albumin 3.7 04/06/2018 08:43 AM    Globulin 3.5 04/06/2018 08:43 AM    A-G Ratio 1.1 04/06/2018 08:43 AM    ALT (SGPT) 33 04/06/2018 08:43 AM     Lab Results   Component Value Date/Time    Iron 80 04/06/2018 08:43 AM    Ferritin 213 04/06/2018 08:43 AM     Lab Results   Component Value Date/Time    Folate 15.8 04/06/2018 08:43 AM     Lab Results   Component Value Date/Time    Vitamin D 25-Hydroxy 19.4 (L) 04/06/2018 08:45 AM         Assessment:     Morbid obesity with associated comorbidity    Plan:     laparoscopic gastric bypass surgery    This is a 48 y.o. female with a BMI of Body mass index is 40.71 kg/m². and the weight-related co-morbidties of as above. Ulises Cardenas meets the NIH criteria for bariatric surgery based upon the BMI of Body mass index is 40.71 kg/m².  and multiple weight-related co-morbidties. Duran Ge has elected laparoscopic gastric bypass as her intervention of choice for treatment of morbid obestiy through surgical means secondary to its uniform results,  profound baseline suppression of hunger and pace at which weight is lost.    In the office today, following Clarissa's history and physical examination, a 30 minute discussion regarding the anatomic alterations for the laparoscopic gastric bypass  was undertaken. The dietary expectations and the patient  dependent factors for success were thoroughly discussed, to include the need for interval follow-up and long-term dietary changes associated with success. The possible short and long term  complications of the gastric bypass were also discussed, to include but not limited to;death, DVT/PE, staple line leak, bleeding, stricture formation, infection,internal hernia  and pouch dilation. Specific weight related outcomes for success were also discussed with an emphasis on careful and close follow-up with the first year and dietary behavior modification over the first years as baseline cyclical hunger returns  The patient expressed an understanding of the above factors, and her questions were answered in their entirety. In addition, the patient attended a 1.5 hour power point seminar regarding obesity, surgical weight loss including, adjustable gastric band, gastric bypass, and sleeve gastrectomy. This discussion contrasted the different surgical techniques, mechanisms of actions and expected outcomes, and surgical and medical risks associated with each procedure. During this seminar, there was a long question and answer session where each questions was answered until there were no additional questions. Today, the patient had all of her questions answered and desires to proceed with  bariatric surgery initially choosing the gastric bypass as her surgical option.     Full consult labs ordered today     Secondary Diagnoses: Dietary Intervention  - The patient is currently scheduled to see or has been followed by a bariatric nutritionist for an attempt at preoperative weight loss as has been dictated by their insurance carrier. They will be assessed at various times during their follow up to evaluate their progress depending on the length of time that is required once again by their carrier. I have explained the importance of preoperative weight loss and the benefits regarding lower surgical risk and also assisting the patient in reaching their weight loss goal.  Finally they understand their is a physiologic benefit from the standpoint of hepatic volume reduction preoperatively. I have reiterated the importance of a low carbohydrate and high protein regimen to achieve their stated goal.     GERD -The patient understands that weight loss surgery is not a guaranteed cure for reflux disease but does understand the benefits that weight loss can have on reflux disease. They also understand that at the time of surgery the gastroesophageal junction will be evaluated for the presence of a diaphragmatic hernia. Hernias will be corrected always with the gastric band and sleeve gastrectomy procedures, but only on a case by case basis with the gastric bypass if it prevents our ability to perform the operation at hand, or if I feel that they would benefit long term with correction of this issue. The patient also understands that neither weight loss surgery nor repair of a diaphragmatic hernia repair guarantees the complete cessation of the disease. They also understand there is a possibility of recurrence with a simple crural repair as is performed with these procedures. They understand they may have to continue their medications in the postoperative period.  They have a good understanding that the gastric bypass procedure is better suited to total resolution of this issue and that neither the Lap Band nor sleeve gastrectomy is considered a curative procedure as it pertains to this diagnosis. Obstructive Sleep Apnea -The patient understands the association of sleep apnea and obesity and the additional risk that it caries related to post surgical complications. We will have the patient bring their CPAP machine to the hospital for use both postoperatively in the PACU and on the floor at its appropriate setting.  We will have them continue using it while at home after surgery and follow up with their pulmonologist 6 months after to be retested to see if it can be discontinued at that time period. Restrictive Airway Disease - We will continue all of their pulmonary medications in the form of oral pills and inhalers in both the perioperative and postoperative period. They understand that their symptoms should improve with weight loss.  Any further testing related to this will be turned over to their family physician or pulmonologist.     Signed By: Ronni Morales MD     April 18, 2022

## 2022-04-27 ENCOUNTER — APPOINTMENT (OUTPATIENT)
Dept: SURGERY | Age: 47
End: 2022-04-27

## 2022-04-27 ENCOUNTER — APPOINTMENT (OUTPATIENT)
Dept: GENERAL RADIOLOGY | Age: 47
End: 2022-04-27
Attending: SPECIALIST
Payer: COMMERCIAL

## 2022-04-27 ENCOUNTER — HOSPITAL ENCOUNTER (OUTPATIENT)
Age: 47
Setting detail: OUTPATIENT SURGERY
Discharge: HOME | End: 2022-04-27
Attending: SPECIALIST | Admitting: SPECIALIST
Payer: COMMERCIAL

## 2022-04-27 ENCOUNTER — HOSPITAL ENCOUNTER (OUTPATIENT)
Dept: LAB | Age: 47
Discharge: HOME | End: 2022-04-27
Payer: COMMERCIAL

## 2022-04-27 VITALS
DIASTOLIC BLOOD PRESSURE: 91 MMHG | OXYGEN SATURATION: 100 % | HEART RATE: 73 BPM | HEIGHT: 63 IN | TEMPERATURE: 98.7 F | SYSTOLIC BLOOD PRESSURE: 127 MMHG | BODY MASS INDEX: 41.43 KG/M2 | WEIGHT: 233.8 LBS | RESPIRATION RATE: 18 BRPM

## 2022-04-27 DIAGNOSIS — E66.01 MORBID OBESITY (HCC): ICD-10-CM

## 2022-04-27 DIAGNOSIS — K30 FUNCTIONAL DYSPEPSIA: ICD-10-CM

## 2022-04-27 LAB
ALBUMIN SERPL-MCNC: 3.6 G/DL (ref 3.4–5)
ALBUMIN/GLOB SERPL: 1.1 {RATIO} (ref 0.8–1.7)
ALP SERPL-CCNC: 70 U/L (ref 45–117)
ALT SERPL-CCNC: 34 U/L (ref 13–56)
ANION GAP SERPL CALC-SCNC: 6 MMOL/L (ref 3–18)
AST SERPL-CCNC: 19 U/L (ref 10–38)
BILIRUB SERPL-MCNC: 0.4 MG/DL (ref 0.2–1)
BUN SERPL-MCNC: 14 MG/DL (ref 7–18)
BUN/CREAT SERPL: 17 (ref 12–20)
CALCIUM SERPL-MCNC: 9.4 MG/DL (ref 8.5–10.1)
CHLORIDE SERPL-SCNC: 106 MMOL/L (ref 100–111)
CO2 SERPL-SCNC: 29 MMOL/L (ref 21–32)
CREAT SERPL-MCNC: 0.84 MG/DL (ref 0.6–1.3)
ERYTHROCYTE [DISTWIDTH] IN BLOOD BY AUTOMATED COUNT: 12.9 % (ref 11.6–14.5)
FERRITIN SERPL-MCNC: 172 NG/ML (ref 8–388)
GLOBULIN SER CALC-MCNC: 3.4 G/DL (ref 2–4)
GLUCOSE SERPL-MCNC: 78 MG/DL (ref 74–99)
HCT VFR BLD AUTO: 43.9 % (ref 35–45)
HGB BLD-MCNC: 14.1 G/DL (ref 12–16)
IRON SERPL-MCNC: 50 UG/DL (ref 50–175)
MCH RBC QN AUTO: 28.7 PG (ref 24–34)
MCHC RBC AUTO-ENTMCNC: 32.1 G/DL (ref 31–37)
MCV RBC AUTO: 89.2 FL (ref 78–100)
NRBC # BLD: 0 K/UL (ref 0–0.01)
NRBC BLD-RTO: 0 PER 100 WBC
PLATELET # BLD AUTO: 260 K/UL (ref 135–420)
PMV BLD AUTO: 9.9 FL (ref 9.2–11.8)
POTASSIUM SERPL-SCNC: 4.5 MMOL/L (ref 3.5–5.5)
PROT SERPL-MCNC: 7 G/DL (ref 6.4–8.2)
RBC # BLD AUTO: 4.92 M/UL (ref 4.2–5.3)
SODIUM SERPL-SCNC: 141 MMOL/L (ref 136–145)
T4 FREE SERPL-MCNC: 0.9 NG/DL (ref 0.7–1.5)
TSH SERPL DL<=0.05 MIU/L-ACNC: 1.79 UIU/ML (ref 0.36–3.74)
WBC # BLD AUTO: 11 K/UL (ref 4.6–13.2)

## 2022-04-27 PROCEDURE — 83540 ASSAY OF IRON: CPT

## 2022-04-27 PROCEDURE — 84443 ASSAY THYROID STIM HORMONE: CPT

## 2022-04-27 PROCEDURE — 74240 X-RAY XM UPR GI TRC 1CNTRST: CPT

## 2022-04-27 PROCEDURE — 76040000019: Performed by: SPECIALIST

## 2022-04-27 PROCEDURE — 85027 COMPLETE CBC AUTOMATED: CPT

## 2022-04-27 PROCEDURE — 84439 ASSAY OF FREE THYROXINE: CPT

## 2022-04-27 PROCEDURE — 80053 COMPREHEN METABOLIC PANEL: CPT

## 2022-04-27 PROCEDURE — 36415 COLL VENOUS BLD VENIPUNCTURE: CPT

## 2022-04-27 PROCEDURE — 82728 ASSAY OF FERRITIN: CPT

## 2022-04-27 PROCEDURE — 74240 X-RAY XM UPR GI TRC 1CNTRST: CPT | Performed by: SPECIALIST

## 2022-04-27 PROCEDURE — 74011000250 HC RX REV CODE- 250: Performed by: SPECIALIST

## 2022-04-27 NOTE — PROCEDURES
Patient:Clarissa Alexis   : 1975  Medical Record KMAROS:[id number]            PREPROCEDURE DIAGNOSIS: This patient is preoperative for laparoscopic gastric bypass surgery procedure with a history of  reflux disease. POSTPROCEDURE DIAGNOSIS: This patient is preoperative for laparoscopic gastric bypass surgery procedure with a history of  reflux disease. PROCEDURES PERFORMED: Upper GI study with barium. ESTIMATED BLOOD LOSS: None. SPECIMENS: None. STATEMENT OF MEDICAL NECESSITY: The patient is a patient with a  longstanding history of obesity. They are now considering the laparoscopic gastric bypass surgery procedure as a means of surgical weight control and due to their history of reflux disease and are being assessed preoperatively for such. DESCRIPTION OF PROCEDURE: The patient was brought to the fluoroscopy unit and  was given thin barium. On swallowing of barium, they were noted to have  normal peristalsis of their esophagus. They had prompt filling of distal  esophagus with tapering into the gastroesophageal junction. There was no evidence of a hiatal hernia present. Contrast then filled the gastric cardia, fundus,body and pre pyloric region with no abnormalities noted. Contrast then exited the pylorus in normal fashion. No obstruction was noted. There was evidence of reflux noted.     (normal anatomy but with notable reflux)    German Hernandez MD

## 2022-07-14 ENCOUNTER — HOSPITAL ENCOUNTER (OUTPATIENT)
Dept: BARIATRICS/WEIGHT MGMT | Age: 47
Discharge: HOME | End: 2022-07-14

## 2022-07-14 VITALS — WEIGHT: 232 LBS | HEIGHT: 63 IN | BODY MASS INDEX: 41.11 KG/M2

## 2022-07-14 NOTE — PROGRESS NOTES
Medical Weight Loss Multi-Disciplinary Program    Patient's Name: Louann Mckeon   Age: 50 y.o. YOB: 1975   Sex: female    Session #2. Pt attended in-person class. Weight obtained in office. Date: 7/14/2022    Visit Vitals  Ht 5' 3\" (1.6 m)   Wt 105.2 kg (232 lb)   BMI 41.10 kg/m²       Pounds Lost since last visit (4/6/22): 4.4 lbs Pounds Gained since last visit: 0.0 lbs    Starting Weight: 229.8 lbs   Previous Visit Weight: 236.4 lbs  Overall Pounds Lost: 0.0 lbs   Overall Pounds Gained: 2.2 lbs    Do you smoke? no    Alcohol intake:  Number of drinks per week: 0    Class Guidelines    Guidelines are reviewed with patient at the start of every class. 1. Patient understands that weight loss trial classes must be consecutive. Patient understands if they miss a class, it is their responsibility to contact me to reschedule class. I will reach out to patient after their first no show. 2.  Patient understands the expectations that weight maintenance/weight loss is expected during the classes. Failure to demonstrate changes may result in extension of weight loss trial, followed by returning to see the surgeon. Patient understands that they CANNOT gain any weight during the weight loss trial.  Gaining weight will result in extension of weight loss trial.  3. Patient is also instructed to complete their labwork, psychological evaluation visit, and any other tests that the surgeon has used while they are working on their weight loss trial.  4.  Patient was instructed to bring their packet of nutrition education materials to every class and appointment. Other Pertinent Information    Changes Made Since Last Class: Pt states that she has started walking, going to the gym, has \"cut sweets\", and is \"controlling protein\". Eating Habits and Behaviors      Today in class we reviewed the Key Diet Principles. Patient was encouraged to consume 3 meals each day, and meal timing was reviewed. Meal time behaviors that will help pt to be successful with their weight loss efforts were also discussed. We discussed the importance of drinking adequate amounts of fluids, recommending that patient consume a minimum of 64 oz of sugar-free, caffeine-free and carbonation-free fluids each day. Patient was encouraged to eliminate sugar-sweetened beverages such as sweet tea, fruit juice, fruit smoothies, flavored coffee drinks and regular sodas. During the weight loss trial, patient was encouraged to focus on eating protein-forward meals, with a daily goal of  grams protein. Patient was also advised to decrease carbohydrate intake to <100 g/d, choosing complex vs. simple carbohydrates in limited amounts. We also discussed limiting fat intake. Encouraged patient to follow the \"3-gram rule\" when choosing foods by looking for items containing <3 g of fat and sugar per serving. We reviewed meal planning guidelines and discussed appropriate meal and snack options. We also talked about appropriate protein drinks and patient was encouraged to start trying these, using them either for a meal replacement or a protein-rich snack. We reviewed the nutritional guidelines for selecting protein shakes. Pre-operative vitamin and mineral supplementation was reviewed. Patient was instructed to choose a chewable complete multivitamin with iron in NON-gummy form. Selection of probiotic was also reviewed. Patient's current diet habits include:  Patient is eating 3 meals and 3 snacks per day. Patient is measuring portions out. Patient indicates they are eating out 0 times per week. This includes fast food, carry out, and sit down restaurants. Per dietary recall, pts diet has the following concerns: Pt is still consuming starchy vegetables, fruit and high fat meats, and pt has not selected 2 protein shakes needed for post-op protein needs.  I spoke with pt after class to discuss my concerns regarding her diet.  Pt states that she is seeing an insurance-required RD [facility] and that she is slowly weaning herself off of these products, \"so instead of eating them 4 d/wk, [she's] eating them only 2d/wk\" currently and pt states that she plans to continue limiting them until she is not consuming them at all. Pt states that she tried a number of protein powders, and that now that she has attended this nutrition class, she plans to try Premier ready-to-drink and [employer]life protein supplements. Pt voiced understanding that she needs to find at least 2 brands of protein supplement prior to surgery for use after surgery to meet pts protein needs. Physical Activity/Exercise    Comments:  Patient is currently walking and using jump rope weights for activity 30-40 min/d x 3-5 d/wk. We talked about recommended types of physical activity, including walking, swimming, cycling, or chair exercises. I also talked with patient about doing some strength training, which helps the metabolism, as well as strengthen muscles and bones. Patient's plan to incorporate more activity includes: \"more activity\". Behavior Modification     Comments:  During today's class, we discussed planning & prepping meals while on vacation as behavior change tool to improve eating behaviors, promote weight loss and long-term weight maintenance, encourage a healthy relationship with food, and prevent chronic disease. Patient was directed to the handout on \"Being Healthy on Vacation\", for guidelines to use while on vacation and as a reminder to limit or avoid Alcohol, keep splurges small, use protein drinks when needed for protein needs and to help stave off hunger while out and about. Additionally, discussed meal planning tips in relation to where the pt is staying and how they are travelling. Goals:   1. Work to increase to 3-4 small meals per day, with 1-2 planned snacks as needed.   Recommend following the plate method for meal planning - focusing on lean protein, non-starchy vegetables, and measured amounts of complex carbohydrates. - Goal of  g protein and <100 g carbohydrates per day. - Select at least 2 DIFFERENT protein supplements that can be used for protein supplementation to meet goals pre- and post-operatively. - Practice Carbohydrate Counting and label reading.   - Follow 3 g rule for fat and sugar. 2. Slow down meals  - Chew each bite 25-35 times. - Aim for 20-30 min/meal.  3. Increase non-caloric fluids to 64 oz per day. Eliminate caffeine, added sugar, carbonation, and straws.               - Continue to work to decrease sugar sweetened beverages - goal of calorie-free beverages only. - Must eliminate caffeine prior to surgery and avoid for ~6-8 weeks. - Practice 30:30 rule,  food and fluid intake. 4. Start activity regimen, work to increase ADLs. 5. Start Complete MVI and probiotic at least 30 days pre-op. Candidate for surgery (per RD): YES - Pt has met insurance requirements for nutrition education . Pt acknowledges understanding that bariatric surgery requires lifelong adherence to dietary and exercise behavior change recommendations in order to be successful with weight loss and weight-loss maintenance. Pt demonstrates understanding of post-op key diet principles and recommendations through questions, and repeating of important dietary guidelines. Pt passed bariatric nutrition quiz with at least 80% pass rate. Pt is engaging in 3-5 days/week of physical activity. All current stated questions answered. RD contact information provided for future nutrition questions or concerns. Pt encouraged to follow-up prn for individual nutrition needs.

## 2022-10-10 ENCOUNTER — OFFICE VISIT (OUTPATIENT)
Dept: SURGERY | Age: 47
End: 2022-10-10
Payer: COMMERCIAL

## 2022-10-10 VITALS
DIASTOLIC BLOOD PRESSURE: 77 MMHG | WEIGHT: 236.1 LBS | TEMPERATURE: 96.9 F | SYSTOLIC BLOOD PRESSURE: 125 MMHG | OXYGEN SATURATION: 99 % | HEART RATE: 84 BPM | BODY MASS INDEX: 43.45 KG/M2 | HEIGHT: 62 IN

## 2022-10-10 DIAGNOSIS — G47.30 SLEEP APNEA, UNSPECIFIED TYPE: ICD-10-CM

## 2022-10-10 DIAGNOSIS — K30 FUNCTIONAL DYSPEPSIA: ICD-10-CM

## 2022-10-10 DIAGNOSIS — J45.909 UNCOMPLICATED ASTHMA, UNSPECIFIED ASTHMA SEVERITY, UNSPECIFIED WHETHER PERSISTENT: ICD-10-CM

## 2022-10-10 DIAGNOSIS — Z90.5 SOLITARY KIDNEY, ACQUIRED: ICD-10-CM

## 2022-10-10 DIAGNOSIS — K44.9 HIATAL HERNIA: ICD-10-CM

## 2022-10-10 DIAGNOSIS — R13.10 DYSPHAGIA, UNSPECIFIED TYPE: ICD-10-CM

## 2022-10-10 DIAGNOSIS — E66.01 MORBID OBESITY (HCC): ICD-10-CM

## 2022-10-10 PROCEDURE — 99213 OFFICE O/P EST LOW 20 MIN: CPT | Performed by: SPECIALIST

## 2022-10-10 RX ORDER — ALPRAZOLAM 0.5 MG/1
TABLET ORAL
COMMUNITY
Start: 2022-08-18

## 2022-10-10 NOTE — PATIENT INSTRUCTIONS
Body Mass Index: Care Instructions  Your Care Instructions    Body mass index (BMI) can help you see if your weight is raising your risk for health problems. It uses a formula to compare how much you weigh with how tall you are. A BMI lower than 18.5 is considered underweight. A BMI between 18.5 and 24.9 is considered healthy. A BMI between 25 and 29.9 is considered overweight. A BMI of 30 or higher is considered obese. If your BMI is in the normal range, it means that you have a lower risk for weight-related health problems. If your BMI is in the overweight or obese range, you may be at increased risk for weight-related health problems, such as high blood pressure, heart disease, stroke, arthritis or joint pain, and diabetes. If your BMI is in the underweight range, you may be at increased risk for health problems such as fatigue, lower protection (immunity) against illness, muscle loss, bone loss, hair loss, and hormone problems. BMI is just one measure of your risk for weight-related health problems. You may be at higher risk for health problems if you are not active, you eat an unhealthy diet, or you drink too much alcohol or use tobacco products. Follow-up care is a key part of your treatment and safety. Be sure to make and go to all appointments, and call your doctor if you are having problems. It's also a good idea to know your test results and keep a list of the medicines you take. How can you care for yourself at home? Practice healthy eating habits. This includes eating plenty of fruits, vegetables, whole grains, lean protein, and low-fat dairy. If your doctor recommends it, get more exercise. Walking is a good choice. Bit by bit, increase the amount you walk every day. Try for at least 30 minutes on most days of the week. Do not smoke. Smoking can increase your risk for health problems. If you need help quitting, talk to your doctor about stop-smoking programs and medicines.  These can increase your chances of quitting for good. Limit alcohol to 2 drinks a day for men and 1 drink a day for women. Too much alcohol can cause health problems. If you have a BMI higher than 25  Your doctor may do other tests to check your risk for weight-related health problems. This may include measuring the distance around your waist. A waist measurement of more than 40 inches in men or 35 inches in women can increase the risk of weight-related health problems. Talk with your doctor about steps you can take to stay healthy or improve your health. You may need to make lifestyle changes to lose weight and stay healthy, such as changing your diet and getting regular exercise. If you have a BMI lower than 18.5  Your doctor may do other tests to check your risk for health problems. Talk with your doctor about steps you can take to stay healthy or improve your health. You may need to make lifestyle changes to gain or maintain weight and stay healthy, such as getting more healthy foods in your diet and doing exercises to build muscle. Where can you learn more? Go to http://www.gray.com/. Enter S176 in the search box to learn more about \"Body Mass Index: Care Instructions. \"  Current as of: June 26, 2018  Content Version: 11.8  © 8143-9896 [employer]. Care instructions adapted under license by [employer] (which disclaims liability or warranty for this information). If you have questions about a medical condition or this instruction, always ask your healthcare professional. [place] [address] any warranty or liability for your use of this information.

## 2022-10-10 NOTE — PROGRESS NOTES
Pre-Operative Progress Consultation  Original consult with Dr. Alexandre Mendez in April with a weight of 229 lbs    Subjective:     Primo Carroll is a 50 y.o. obese female with a Body mass index is 43.18 kg/m². Elizabeth Copping she desires surgery at this time because of health issues and quality of life issues. Primo Carroll has tried multiple diets in her lifetime most recently tried physician supervised, behavior modification, and unsupervised diets  Bariatric comorbidities present are   Patient Active Problem List   Diagnosis Code    GARRETT (stress urinary incontinence, female) N39.3    Urinary incontinence without sensory awareness N39.42    Solitary RIGHT kidney, acquired (hx donor left kidney) Z90.5    History of recurrent UTIs Z87.440    Psychiatric disorder F99    BMI 40.0-44.9, adult ([facility] Z68.41    Anxiety F41.9    Incomplete bladder emptying R33.9    Morbid obesity (HCC) E66.01    MELISSA on CPAP G47.33, Z99.89    Behaviorally induced insufficient sleep syndrome F51.12    Chronic fatigue R53.82    Bacterial vaginosis N76.0, B96.89    Bilateral hip pain M25.551, M25.552    Dysphagia R13.10    Muscle weakness (generalized) M62.81    Sciatica of right side M54.31    OAB (overactive bladder) N32.81    Morbid obesity with body mass index (BMI) of 40.0 to 49.9 (HCC) E66.01    Sleep apnea G47.30    Asthma J45.909    Functional dyspepsia K30    Hiatal hernia K44.9    Interstitial cystitis N30.10     The patient desires laparoscopic gastric bypass surgery for surgical weight loss. Primo Carroll is here today to check progress with weight loss / evaluate nutritional status and review all subspecialty clearances in hopes of proceeding to the operating room.      Patient Active Problem List    Diagnosis Date Noted    OAB (overactive bladder) 07/12/2021    Morbid obesity with body mass index (BMI) of 40.0 to 49.9 (HCC)     Sleep apnea     Asthma     Functional dyspepsia     Hiatal hernia     Interstitial cystitis     Bilateral hip pain 10/18/2019    Muscle weakness (generalized) 10/18/2019    Sciatica of right side 10/18/2019    Bacterial vaginosis 2019    Dysphagia 2019    Morbid obesity ([facility] 2018    MELISSA on CPAP 2018    Incomplete bladder emptying     Behaviorally induced insufficient sleep syndrome 11/15/2017    Chronic fatigue 11/15/2017    BMI 40.0-44.9, adult ([facility] 2017    Anxiety 2017    Urinary incontinence without sensory awareness     Solitary RIGHT kidney, acquired (hx donor left kidney)     History of recurrent UTIs     Psychiatric disorder     GARRETT (stress urinary incontinence, female)       Past Surgical History:   Procedure Laterality Date    HX  SECTION      HX CHOLECYSTECTOMY      HX HYSTERECTOMY      partial     HX NEPHRECTOMY Left 2000    Donated left kidney    HX TUBAL LIGATION      HX UROLOGICAL      removal of Interstem stimulation device    HX UROLOGICAL      Interstem stimulation device      Social History     Tobacco Use    Smoking status: Never    Smokeless tobacco: Never   Substance Use Topics    Alcohol use: Yes     Alcohol/week: 1.0 standard drink     Types: 1 Shots of liquor per week     Comment:  rarely drink   Q 3 mos.       Family History   Problem Relation Age of Onset    Elevated Lipids Mother     Hypertension Mother     No Known Problems Father       Current Outpatient Medications   Medication Sig Dispense Refill    ALPRAZolam (XANAX) 0.5 mg tablet TAKE 1 TABLET BY MOUTH TWICE A DAY AS NEEDED      VENTOLIN HFA 90 mcg/actuation inhaler       acetaminophen-codeine (TYLENOL #3) 300-30 mg per tablet        Allergies   Allergen Reactions    Chlorhexidine Rash and Itching     ?allergy to skin prep during surgery    Ibuprofen Other (comments)     Patient has 1 kidney and can not take Ibuprofen    Morphine Other (comments)     GI DISTRESS       Sulfa (Sulfonamide Antibiotics) Other (comments)     GI DISTRESS     Sulfamethoxazole-Trimethoprim Unknown (comments)    Tape [Adhesive] Rash    Vicodin [Hydrocodone-Acetaminophen] Other (comments)     Psychological Reaction             Review of Systems:        General - No history or complaints of unexpected fever, chills, or weight loss  Head/Neck - No history or complaints of headache, diplopia, dysphagia, hearing loss  Cardiac - No history or complaints of chest pain, palpitations, murmur, or shortness of breath  Pulmonary - No history or complaints of shortness of breath, productive cough, hemoptysis  Gastrointestinal - No history or complaints of reflux,  abdominal pain, obstipation/constipation, blood per rectum  Genitourinary - No history or complaints of hematuria/dysuria, stress urinary incontinence symptoms, or renal lithiasis  Musculoskeletal - No history or complaints of joint pain or muscular weakness  Hematologic - No history or complaints of bleeding disorders, blood transfusions, sickle cell anemia  Neurologic - No history or complaints of  migraine headaches, seizure activity, syncopal episodes, TIA or stroke  Integumentary - No history or complaints of rashes, abnormal nevi, skin cancer  Gynecological - No history of heavy menses/abnormal menses    Objective:     Visit Vitals  /77 (BP 1 Location: Left upper arm, BP Patient Position: Sitting, BP Cuff Size: Large adult)   Pulse 84   Temp 96.9 °F (36.1 °C)   Ht 5' 2\" (1.575 m)   Wt 107.1 kg (236 lb 1.6 oz)   SpO2 99%   BMI 43.18 kg/m²     Physical Examination: General appearance - alert, well appearing, and in no distress  Mental status - alert, oriented to person, place, and time  Eyes - pupils equal and reactive, extraocular eye movements intact  Ears - external ear canals normal  Nose - normal and patent, no erythema, discharge or polyps  Mouth - mucous membranes moist, pharynx normal without lesions  Neck - supple, no significant adenopathy  Lymphatics - no palpable lymphadenopathy, no hepatosplenomegaly  Chest - clear to auscultation, no wheezes, rales or rhonchi, symmetric air entry  Heart - normal rate, regular rhythm, normal S1, S2, no murmurs, rubs, clicks or gallops  Abdomen - soft, nontender, nondistended, no masses or organomegaly  Back exam - full range of motion, no tenderness, palpable spasm or pain on motion  Neurological - alert, oriented, normal speech, no focal findings or movement disorder noted  Musculoskeletal - no joint tenderness, deformity or swelling  Extremities - peripheral pulses normal, no pedal edema, no clubbing or cyanosis  Skin - normal coloration and turgor, no rashes, no suspicious skin lesions noted    Labs:             Assessment:     Morbid obesity with associated comorbidity     Plan:     Continuation of Pre-Operative evaluation / clearance. Stephanie Roman has returned to the office today to discuss her status as a surgical candidate.  her progress has been noted and reviewed. We will continue the pre-operative process and work towards goals as outlined. she has NA more nutritional visits to complete before proceeding to the OR  she has an outstanding NA clearance to review before proceeding to the OR. Stephanie Roman understand the rationales for all the above. It has been discussed that given her obese condition that the best surgical option for this patient would be the laparoscopic gastric bypass surgery. Stephanie Roman agrees with the surgical choice and has been educated in it's; risks, benefits, and alternatives. We will continue with the pre-operative evaluation as needed to check progress. The patient understands the plan of action    There has been no change in her medical or surgical history since her consult in April    She has gained 7 lbs since her consult but she notes this has occurred over the past month due to grieve eating as her brother unexpectedly  in mid September. She completed her dietary visits via an outside nutrition provider.     Her UGI showed notable reflux but otherwise normal anatomy At this point there are no risks factors for her having a gastric bypass. Secondary Diagnoses:     Dietary Intervention  - The patient is currently scheduled to see or has been followed by a bariatric nutritionist for an attempt at preoperative weight loss as has been dictated by their insurance carrier. They will be assessed at various times during their follow up to evaluate their progress depending on the length of time that is required once again by their carrier. I have explained the importance of preoperative weight loss and the benefits regarding lower surgical risk and also assisting the patient in reaching their weight loss goal.  Finally they understand their is a physiologic benefit from the standpoint of hepatic volume reduction preoperatively. I have reiterated the importance of a low carbohydrate and high protein regimen to achieve their stated goal.    Obstructive Sleep Apnea -The patient understands the association of sleep apnea and obesity and the additional risk that it caries related to post surgical complications. We will have the patient bring their CPAP machine to the hospital for use both postoperatively in the PACU and on the floor at its appropriate setting. We will have them continue using it while at home after surgery and follow up with their pulmonologist 6 months after to be retested to see if it can be discontinued at that time period. Weight Related Arthritis -The patient understands the benefits that weight loss surgery can have on their arthritis but also understands that weight loss is not a guaranteed cure and relief of symptoms is often dependent on the severity of the underlying disease.   The patient also understands that traditional pharmaceutical treatments for this diagnosis are usually unavailable to post-operative weight loss patients due to the effects on the gastrointestinal tract particularly with the gastric bypass and to a lesser effect with the sleeve gastrectomy. Any changes to the patients medication treatment will ultimately be made the patients PCP with input by our office. I sent a total of 60 minutes providing this service to the patient today to include discussing their surgical choice, reviewing their work-up to date, and performing a full history and physical examination.        Signed By: GUILLE Lala     October 10, 2022

## 2022-11-11 DIAGNOSIS — E66.01 MORBID OBESITY (HCC): Primary | ICD-10-CM

## 2022-11-11 DIAGNOSIS — G47.30 SLEEP APNEA, UNSPECIFIED TYPE: ICD-10-CM

## 2022-11-11 DIAGNOSIS — Z01.812 BLOOD TESTS PRIOR TO TREATMENT OR PROCEDURE: ICD-10-CM

## 2022-11-11 DIAGNOSIS — E66.01 MORBID OBESITY WITH BODY MASS INDEX (BMI) OF 40.0 TO 49.9 (HCC): ICD-10-CM

## 2022-12-19 ENCOUNTER — HOSPITAL ENCOUNTER (OUTPATIENT)
Dept: BARIATRICS/WEIGHT MGMT | Age: 47
Discharge: HOME | End: 2022-12-19

## 2022-12-19 ENCOUNTER — HOSPITAL ENCOUNTER (OUTPATIENT)
Dept: PREADMISSION TESTING | Age: 47
Discharge: HOME | End: 2022-12-19
Payer: COMMERCIAL

## 2022-12-19 ENCOUNTER — TELEPHONE (OUTPATIENT)
Dept: SURGERY | Age: 47
End: 2022-12-19

## 2022-12-19 ENCOUNTER — OFFICE VISIT (OUTPATIENT)
Dept: SURGERY | Age: 47
End: 2022-12-19
Payer: COMMERCIAL

## 2022-12-19 VITALS
OXYGEN SATURATION: 97 % | HEART RATE: 87 BPM | HEIGHT: 62 IN | DIASTOLIC BLOOD PRESSURE: 65 MMHG | TEMPERATURE: 98.4 F | WEIGHT: 235 LBS | BODY MASS INDEX: 43.24 KG/M2 | SYSTOLIC BLOOD PRESSURE: 129 MMHG

## 2022-12-19 DIAGNOSIS — K30 FUNCTIONAL DYSPEPSIA: ICD-10-CM

## 2022-12-19 DIAGNOSIS — K44.9 HIATAL HERNIA: ICD-10-CM

## 2022-12-19 DIAGNOSIS — J45.909 UNCOMPLICATED ASTHMA, UNSPECIFIED ASTHMA SEVERITY, UNSPECIFIED WHETHER PERSISTENT: ICD-10-CM

## 2022-12-19 DIAGNOSIS — G89.18 POST-OP PAIN: ICD-10-CM

## 2022-12-19 DIAGNOSIS — M25.552 BILATERAL HIP PAIN: ICD-10-CM

## 2022-12-19 DIAGNOSIS — Z90.5 SOLITARY KIDNEY, ACQUIRED: ICD-10-CM

## 2022-12-19 DIAGNOSIS — E66.01 MORBID OBESITY (HCC): Primary | ICD-10-CM

## 2022-12-19 DIAGNOSIS — G47.33 OSA ON CPAP: ICD-10-CM

## 2022-12-19 DIAGNOSIS — Z99.89 OSA ON CPAP: ICD-10-CM

## 2022-12-19 DIAGNOSIS — M25.551 BILATERAL HIP PAIN: ICD-10-CM

## 2022-12-19 LAB
ALBUMIN SERPL-MCNC: 3.6 G/DL (ref 3.4–5)
ALBUMIN/GLOB SERPL: 1.2 {RATIO} (ref 0.8–1.7)
ALP SERPL-CCNC: 69 U/L (ref 45–117)
ALT SERPL-CCNC: 28 U/L (ref 13–56)
ANION GAP SERPL CALC-SCNC: 3 MMOL/L (ref 3–18)
AST SERPL-CCNC: 13 U/L (ref 10–38)
ATRIAL RATE: 82 BPM
BASOPHILS # BLD: 0 K/UL (ref 0–0.1)
BASOPHILS NFR BLD: 0 % (ref 0–2)
BILIRUB SERPL-MCNC: 0.4 MG/DL (ref 0.2–1)
BUN SERPL-MCNC: 16 MG/DL (ref 7–18)
BUN/CREAT SERPL: 16 (ref 12–20)
CALCIUM SERPL-MCNC: 8.9 MG/DL (ref 8.5–10.1)
CALCULATED P AXIS, ECG09: 74 DEGREES
CALCULATED R AXIS, ECG10: 20 DEGREES
CALCULATED T AXIS, ECG11: 30 DEGREES
CHLORIDE SERPL-SCNC: 108 MMOL/L (ref 100–111)
CO2 SERPL-SCNC: 29 MMOL/L (ref 21–32)
CREAT SERPL-MCNC: 1.01 MG/DL (ref 0.6–1.3)
DIAGNOSIS, 93000: NORMAL
DIFFERENTIAL METHOD BLD: ABNORMAL
EOSINOPHIL # BLD: 0.1 K/UL (ref 0–0.4)
EOSINOPHIL NFR BLD: 1 % (ref 0–5)
ERYTHROCYTE [DISTWIDTH] IN BLOOD BY AUTOMATED COUNT: 13 % (ref 11.6–14.5)
GLOBULIN SER CALC-MCNC: 3.1 G/DL (ref 2–4)
GLUCOSE SERPL-MCNC: 96 MG/DL (ref 74–99)
HCT VFR BLD AUTO: 40.8 % (ref 35–45)
HGB BLD-MCNC: 13.4 G/DL (ref 12–16)
IMM GRANULOCYTES # BLD AUTO: 0.1 K/UL (ref 0–0.04)
IMM GRANULOCYTES NFR BLD AUTO: 1 % (ref 0–0.5)
LYMPHOCYTES # BLD: 3 K/UL (ref 0.9–3.6)
LYMPHOCYTES NFR BLD: 24 % (ref 21–52)
MCH RBC QN AUTO: 29.3 PG (ref 24–34)
MCHC RBC AUTO-ENTMCNC: 32.8 G/DL (ref 31–37)
MCV RBC AUTO: 89.1 FL (ref 78–100)
MONOCYTES # BLD: 0.8 K/UL (ref 0.05–1.2)
MONOCYTES NFR BLD: 7 % (ref 3–10)
NEUTS SEG # BLD: 8.3 K/UL (ref 1.8–8)
NEUTS SEG NFR BLD: 67 % (ref 40–73)
NRBC # BLD: 0 K/UL (ref 0–0.01)
NRBC BLD-RTO: 0 PER 100 WBC
P-R INTERVAL, ECG05: 154 MS
PLATELET # BLD AUTO: 249 K/UL (ref 135–420)
PMV BLD AUTO: 9.8 FL (ref 9.2–11.8)
POTASSIUM SERPL-SCNC: 4 MMOL/L (ref 3.5–5.5)
PROT SERPL-MCNC: 6.7 G/DL (ref 6.4–8.2)
Q-T INTERVAL, ECG07: 368 MS
QRS DURATION, ECG06: 82 MS
QTC CALCULATION (BEZET), ECG08: 429 MS
RBC # BLD AUTO: 4.58 M/UL (ref 4.2–5.3)
SODIUM SERPL-SCNC: 140 MMOL/L (ref 136–145)
VENTRICULAR RATE, ECG03: 82 BPM
WBC # BLD AUTO: 12.4 K/UL (ref 4.6–13.2)

## 2022-12-19 PROCEDURE — 93005 ELECTROCARDIOGRAM TRACING: CPT

## 2022-12-19 PROCEDURE — 99215 OFFICE O/P EST HI 40 MIN: CPT | Performed by: SPECIALIST

## 2022-12-19 PROCEDURE — 36415 COLL VENOUS BLD VENIPUNCTURE: CPT

## 2022-12-19 PROCEDURE — 80053 COMPREHEN METABOLIC PANEL: CPT

## 2022-12-19 PROCEDURE — 85025 COMPLETE CBC W/AUTO DIFF WBC: CPT

## 2022-12-19 RX ORDER — HYDROMORPHONE HYDROCHLORIDE 2 MG/1
2 TABLET ORAL
Qty: 30 TABLET | Refills: 0 | Status: SHIPPED | OUTPATIENT
Start: 2022-12-19 | End: 2022-12-24

## 2022-12-19 RX ORDER — ENOXAPARIN SODIUM 100 MG/ML
40 INJECTION SUBCUTANEOUS EVERY 12 HOURS
Qty: 20 EACH | Refills: 0 | Status: SHIPPED | OUTPATIENT
Start: 2022-12-19

## 2022-12-19 RX ORDER — ONDANSETRON 8 MG/1
8 TABLET, ORALLY DISINTEGRATING ORAL
Qty: 25 TABLET | Refills: 0 | Status: SHIPPED | OUTPATIENT
Start: 2022-12-19

## 2022-12-19 NOTE — PROGRESS NOTES
Gastric Bypass - History and Physical    Subjective: The patient is a 50 y.o. obese female with a Body mass index is 42.98 kg/m². .   she presents now to review their work up to date to see if they are a candidate for surgery and whether or not to proceed with the previously requested procedure. Bariatric comorbidities continue to include:   Patient Active Problem List   Diagnosis Code    GARRETT (stress urinary incontinence, female) N39.3    Urinary incontinence without sensory awareness N39.42    Solitary RIGHT kidney, acquired (hx donor left kidney) Z90.5    History of recurrent UTIs Z87.440    Psychiatric disorder F99    BMI 40.0-44.9, adult ([facility] [address] Z68.41    Anxiety F41.9    Incomplete bladder emptying R33.9    Morbid obesity (HCC) E66.01    MELISSA on CPAP G47.33, Z99.89    Behaviorally induced insufficient sleep syndrome F51.12    Chronic fatigue R53.82    Bacterial vaginosis N76.0, B96.89    Bilateral hip pain M25.551, M25.552    Dysphagia R13.10    Muscle weakness (generalized) M62.81    Sciatica of right side M54.31    OAB (overactive bladder) N32.81    Morbid obesity with body mass index (BMI) of 40.0 to 49.9 ([facility]) E66.01    Sleep apnea G47.30    Asthma J45.909    Functional dyspepsia K30    Hiatal hernia K44.9    Interstitial cystitis N30.10       They have been generally well prior to this visit and have had no recent significant illnesses. The patient has had no gastrointestinal issues that would preclude them from proceeding with the surgery they have chosen. Angela Drew has recently tried a preoperative weight loss program  in addition to seeing a bariatric nutritionist preoperatively. We have discussed on at least one other occasion about the various types of surgical weight loss procedures and they have considered these options after our initial consultation. We have once again discussed these procedures in detail and they have now decided on a surgical procedure.   They present today to discuss this and confirm that their evaluation pre operatively is acceptable to continue with surgery. The patient desires laparoscopic gastric bypass surgery for surgical weight loss. The patients goal weight is 140lb. These goals are consistent with expected outcomes of their desired operation. her Medical goals are resolution of these health issues. Patient Active Problem List    Diagnosis Date Noted    OAB (overactive bladder) 2021    Morbid obesity with body mass index (BMI) of 40.0 to 49.9 ([facility])     Sleep apnea     Asthma     Functional dyspepsia     Hiatal hernia     Interstitial cystitis     Bilateral hip pain 10/18/2019    Muscle weakness (generalized) 10/18/2019    Sciatica of right side 10/18/2019    Bacterial vaginosis 2019    Dysphagia 2019    Morbid obesity ([address] 2018    MELISSA on CPAP 2018    Incomplete bladder emptying     Behaviorally induced insufficient sleep syndrome 11/15/2017    Chronic fatigue 11/15/2017    BMI 40.0-44.9, adult ([address] 2017    Anxiety 2017    Urinary incontinence without sensory awareness     Solitary RIGHT kidney, acquired (hx donor left kidney)     History of recurrent UTIs     Psychiatric disorder     GARRETT (stress urinary incontinence, female)       Past Surgical History:   Procedure Laterality Date    HX  SECTION      HX CHOLECYSTECTOMY      HX HYSTERECTOMY      partial     HX NEPHRECTOMY Left 2000    Donated left kidney    HX TUBAL LIGATION      HX UROLOGICAL      removal of Interstem stimulation device    HX UROLOGICAL      Interstem stimulation device      Social History     Tobacco Use    Smoking status: Never    Smokeless tobacco: Never   Substance Use Topics    Alcohol use: Yes     Alcohol/week: 1.0 standard drink     Types: 1 Shots of liquor per week     Comment:  rarely drink   Q 3 mos.       Family History   Problem Relation Age of Onset    Elevated Lipids Mother     Hypertension Mother     No Known Problems Father     Drug Abuse Brother       Current Outpatient Medications   Medication Sig Dispense Refill    ALPRAZolam (XANAX) 0.5 mg tablet TAKE 1 TABLET BY MOUTH TWICE A DAY AS NEEDED      acetaminophen-codeine (TYLENOL #3) 300-30 mg per tablet  (Patient not taking: Reported on 12/19/2022)       Allergies   Allergen Reactions    Chlorhexidine Rash and Itching     ?allergy to skin prep during surgery    Ibuprofen Other (comments)     Patient has 1 kidney and can not take Ibuprofen    Morphine Other (comments)     GI DISTRESS       Sulfa (Sulfonamide Antibiotics) Other (comments)     GI DISTRESS     Sulfamethoxazole-Trimethoprim Unknown (comments)    Tape [Adhesive] Rash    Vicodin [Hydrocodone-Acetaminophen] Other (comments)     Psychological Reaction             Review of Systems:          General - No history or complaints of unexpected fever, chills, or weight loss  Head/Neck - No history or complaints of headache, diplopia, dysphagia, hearing loss  Cardiac - No history or complaints of chest pain, palpitations, murmur, or shortness of breath  Pulmonary - No history or complaints of shortness of breath, productive cough, hemoptysis  Gastrointestinal - moderate reflux,no  abdominal pain, obstipation/constipation or blood per rectum  Genitourinary - No history or complaints of hematuria/dysuria, stress urinary incontinence symptoms, or renal lithiasis  Musculoskeletal - severe  joint pain in their back,  no muscular weakness  Hematologic - No history or complaints of bleeding disorders,  No blood transfusions  Neurologic - No history or complaints of  migraine headaches, seizure activity, syncopal episodes, TIA or stroke  Integumentary - No history or complaints of rashes, abnormal nevi, skin cancer  Gynecological - n/a             Objective:   Visit Vitals  /65 (BP 1 Location: Left upper arm, BP Patient Position: Sitting, BP Cuff Size: Large adult long)   Pulse 87   Temp 98.4 °F (36.9 °C)   Ht 5' 2\" (1.575 m) Wt 106.6 kg (235 lb)   SpO2 97%   BMI 42.98 kg/m²       Physical Examination: General appearance - alert, well appearing, and in no distress and oriented to person, place, and time  Mental status - alert, oriented to person, place, and time, normal mood, behavior, speech, dress, motor activity, and thought processes  Eyes - pupils equal and reactive, extraocular eye movements intact, sclera anicteric, left eye normal, right eye normal  Ears - right ear normal, left ear normal  Nose - normal and patent, no erythema, discharge or polyps  Mouth - mucous membranes moist, pharynx normal without lesions  Neck - supple, no significant adenopathy  Lymphatics - no palpable lymphadenopathy, no hepatosplenomegaly  Chest - clear to auscultation, no wheezes, rales or rhonchi, symmetric air entry  Heart - normal rate, regular rhythm, normal S1, S2, no murmurs, rubs, clicks or gallops  Abdomen - soft, nontender, nondistended, no masses or organomegaly  Back exam - full range of motion, no tenderness, palpable spasm or pain on motion  Neurological - alert, oriented, normal speech, no focal findings or movement disorder noted  Musculoskeletal - no joint tenderness, deformity or swelling  Extremities - peripheral pulses normal, no pedal edema, no clubbing or cyanosis    Labs :     Lab Results   Component Value Date/Time    WBC 11.0 04/27/2022 12:50 PM    HGB 14.1 04/27/2022 12:50 PM    HCT 43.9 04/27/2022 12:50 PM    PLATELET 087 66/82/9315 12:50 PM    MCV 89.2 04/27/2022 12:50 PM     Lab Results   Component Value Date/Time    Sodium 141 04/27/2022 12:50 PM    Potassium 4.5 04/27/2022 12:50 PM    Chloride 106 04/27/2022 12:50 PM    CO2 29 04/27/2022 12:50 PM    Anion gap 6 04/27/2022 12:50 PM    Glucose 78 04/27/2022 12:50 PM    BUN 14 04/27/2022 12:50 PM    Creatinine 0.84 04/27/2022 12:50 PM    BUN/Creatinine ratio 17 04/27/2022 12:50 PM    GFR est AA >60 04/27/2022 12:50 PM    GFR est non-AA >60 04/27/2022 12:50 PM    Calcium 9.4 2022 12:50 PM    Bilirubin, total 0.4 2022 12:50 PM    Alk. phosphatase 70 2022 12:50 PM    Protein, total 7.0 2022 12:50 PM    Albumin 3.6 2022 12:50 PM    Globulin 3.4 2022 12:50 PM    A-G Ratio 1.1 2022 12:50 PM    ALT (SGPT) 34 2022 12:50 PM     Lab Results   Component Value Date/Time    Iron 50 2022 12:50 PM    Ferritin 172 2022 12:50 PM     Lab Results   Component Value Date/Time    Folate 15.8 2018 08:43 AM     Lab Results   Component Value Date/Time    Vitamin D 25-Hydroxy 19.4 (L) 2018 08:45 AM               Cardiac / Pulmonary Evaluation:         UGI Results:        GUILLE Cosby   Physician Assistant   Physician Assistant   Procedures      Signed   Date of Service:  22 1344                                                                                     Patient:Clarissa Alexis     : 1975  Medical Record [id number]                 PREPROCEDURE DIAGNOSIS: This patient is preoperative for laparoscopic gastric bypass surgery procedure with a history of  reflux disease. POSTPROCEDURE DIAGNOSIS: This patient is preoperative for laparoscopic gastric bypass surgery procedure with a history of  reflux disease. PROCEDURES PERFORMED: Upper GI study with barium. ESTIMATED BLOOD LOSS: None. SPECIMENS: None. STATEMENT OF MEDICAL NECESSITY: The patient is a patient with a  longstanding history of obesity. They are now considering the laparoscopic gastric bypass surgery procedure as a means of surgical weight control and due to their history of reflux disease and are being assessed preoperatively for such. DESCRIPTION OF PROCEDURE: The patient was brought to the fluoroscopy unit and  was given thin barium. On swallowing of barium, they were noted to have  normal peristalsis of their esophagus. They had prompt filling of distal  esophagus with tapering into the gastroesophageal junction.  There was no evidence of a hiatal hernia present. Contrast then filled the gastric cardia, fundus,body and pre pyloric region with no abnormalities noted. Contrast then exited the pylorus in normal fashion. No obstruction was noted. There was evidence of reflux noted. (normal anatomy but with notable reflux)     Madyson De La Rosa MD          Assessment:     Morbid obesity with associated comorbidity    Plan:     laparoscopic gastric bypass surgery    This is a 50 y.o. female with a BMI of Body mass index is 42.98 kg/m². and the weight-related co-morbidties as noted above. Nadine Russo meets the NIH criteria for bariatric surgery based upon the BMI of Body mass index is 42.98 kg/m². and   multiple weight-related co-morbidties. Nadine Rsuso has elected laparoscopic gastric bypass as her intervention of choice for treatment of morbid obestiy through surgical means secondary to its   uniform results,  profound baseline suppression of hunger and pace at which weight is lost.    In the office today, following Clarissa's history and physical examination, a 40 minute discussion regarding the anatomic alterations for the laparoscopic gastric bypass  was undertaken. The dietary   expectations and the patient  dependent factors for success were thoroughly discussed, to include the need for interval follow-up and long-term dietary changes associated with success. The possible short   and long term  complications of the gastric bypass were also discussed, to include but not limited to;death, DVT/PE, staple line leak, bleeding, stricture formation, infection,internal hernia  and pouch dilation. Specific weight related outcomes for success were also discussed with an emphasis on careful and close follow-up with the first year and Dietary behavior modification over the first years as baseline cyclical   hunger returns  The patient expressed an understanding of the above factors, and her questions were answered in their entirety.     In addition, the patient attended a 1.5 hour power point seminar or online seminar regarding obesity, surgical weight loss including, adjustable gastric band, gastric bypass, and sleeve gastrectomy. This discussion contrasted   the different surgical techniques, mechanisms of actions and expected outcomes, and surgical and medical risks associated with each procedure. During the in office seminar, there was a long question and answer   session where each questions was answered until there were no additional questions. Today, the patient had all of her questions answered and the decision was made today that the patient's preoperative evaluation is acceptable for them  to proceed with bariatric surgery  choosing  gastric bypass as her surgical option. Secondary Diagnoses:     DVT / Pulmonary Embolus Risk - The patient is at a higher risk for post operative DVT / pulmonary embolus secondary to their morbid obese status, relative sedentary   lifestyle, and impending general anesthetic. We will plan to use anticoagulation therapy pre and post operative as well as TEDs and  pneumatic compression devices and   encourage ambulation once on the hospital nursing floor. The need for  at home anticoagulation therapy has also been discussed. The patient understands   that their efforts at ambulation are of vital importance to reduce the risk of this complication thus placing significant burden on them as to the prevention of such issues. Signs and symptoms of DVT / PE have been discussed with the patient and they have been instructed to call the office if any of these occur in the \"at home\" post op phase. The patient has been provided with a post operative prescription of Lovenox and instructed in its use for DVT prophylaxis. GERD -The patient understands that weight loss surgery is not a guaranteed cure for reflux disease but does understand the benefits that weight loss can have on reflux disease.   They also understand that at the time of surgery the gastroesophageal junction will be evaluated for the presence of a diaphragmatic hernia. Hernias will be corrected always with the surgical procedure if possible and is deemed safe. The patient also understands that neither weight loss surgery nor repair of a diaphragmatic hernia repair guarantees the complete cessation of the disease. They also understand there is a possibility of recurrence of these hernias with a simple crural repair as is performed with these procedures. They understand they may have to continue their medications in the postoperative period. They have a good understanding that the gastric bypass procedure is better suited to total resolution of this issue and that neither the Lap Band or sleeve gastrectomy is considered a curative procedure as it pertains to this diagnosis. Obstructive Sleep Apnea -The patient understands the association of sleep apnea and obesity and the additional risk that it caries related to post surgical complications. If they have not been tested for sleep apnea and I feel they are at increased risk for this diagnosis, then they will be scheduled for a consultation with a Pulmonologist for such. In the event that they lemuel this diagnosis we will have the patient bring their CPAP machine to the hospital for use both postoperatively in the PACU and on the floor at its appropriate setting. We will have them continue using it while at home after surgery and follow up with their pulmonologist 6 months after to be retested to see if it can be discontinued at that time period. Weight Related Arthritis -The patient understands the benefits that weight loss surgery can have on their arthritis but also understands that weight loss is not a guaranteed cure and relief of symptoms is often dependent on the severity of the underlying disease.   The patient also understands that traditional pharmaceutical treatments for this diagnosis are usually unavailable to post-operative weight loss patients due to the effects on the gastrointestinal tract particularly with the gastric bypass and to a lesser effect with the sleeve gastrectomy. Any changes to the patients medication treatment will ultimately be made the patients PCP with input by our office. Signed By: Stephanie Curiel MD     December 19, 2022           I spent a total of 45 minutes providing this service to the patient today to include discussing their surgical choice, reviewing their work-up to date, and performing a full history and physical examination.

## 2022-12-19 NOTE — TELEPHONE ENCOUNTER
Patient was contacted to discuss some important pre-op information and education in preparation for surgery. Diabetes:  Have you been testing your blood sugar levels at home, and what have they been the past few days? Pt is not diabetic     If they are not significantly lower than 200 the entire month prior to surgery, you need to let us know and contact your family physician for an appointment. Hypertension:  Are you monitoring your blood pressure at home, and what has it been? No hypertension     If your blood pressure has not been well-controlled with a systolic pressure lower than  244 and a diastolic pressure lower than 100, you need to contact your family physician for an appointment. Steroids (oral and/or injectable):  Have you recently taken any oral steroids such as Prednisone or a Medrol dose pack for a respiratory infection or COVID? No steroids     Have you had any steroid injections such as a cortisone injection in your back, knee, foot, or any other part of your body? No     You can not have any oral steroids and/or steroid injections 45 days prior to surgery. NSAIDS:  Have you recently taken any NSAIDS such as Mobic, Aleve, Naprosyn, Motrin, BC or Goody's powder? Pt aware     NSAIDS must be stopped 14 days prior to surgery. If you are currently taking aspirin or Plavix that a physician has prescribed, continue to take it as prescribed, and a provider in our office will advise you during your pre-op appointment. Birth Control:  Are you currently taking any form of birth control? If so, are you taking the Depo Provera injection or oral birth control pills? Pt is aware       You need to stop taking it two weeks prior to surgery and can start back two weeks after surgery. You will need to use another form of protection during this time, such as condoms to avoid pregancy.   If you have an IUD or implant as a form of birth control, it is okay, and you may continue. Medication:  Have you had any significant changes to any medication?   No changes

## 2022-12-19 NOTE — PROGRESS NOTES
CLINICAL NUTRITION PRE-OPERATIVE EDUCATION    Patient's Name: Clemetine Claude   Age: 50 y.o. YOB: 1975   Sex: female    Education & Materials Provided - Post-op Binder to include:  Liquid Diet Shopping List   Supplemental Resource Guide: MVI, Vitamin B12, Calcium Citrate, Vitamin D, Vitamin B50, and Iron recommendations  Protein Supplement Information   Fluid Requirements/No Straws  No Caffeine or Carbonation   No Alcohol                               No Snacks or No Concentrated Sweets                                   Exercise Guidelines   Key Diet Principles                            Addressed Current Habits/Changes to Make   Patient was instructed on clear liquid diet guidelines to follow for one (1) day prior to surgery. Patient has been educated on the liquid diet to begin day 2 post-op and continue for 2 weeks  Patient understands the timeline for diet progression and the need to remain on full liquid diet until advanced by provider and dietitian. Summary:  Patient has completed the required visits with the Registered Dietitian. During these nutrition visits, we focused on dietary changes, behavior modifications, and the importance of establishing an exercise routine. The pre-op diet protocol that patient was prescribed emphasized low carbohydrate intake (less than 50 grams per day) and 60-80 grams (g) of protein per day. At today's session, patient was educated on the post-op diet and vitamin/mineral protocols. Patient understands the importance of keeping total fat and sugar intake to less than 3g per serving. Patient is aware of the the timeline for the different stages of the post-op diet and is aware that they will be on a modified full liquid diet for 2 weeks post-op. Patient understands that the body needs ~60-70 g/d protein. During the liquid diet phase, patient will need a minimum of 60 g/d protein from supplemental shakes.   Once eating soft protein-rich foods, patient will need 40-60 g/d protein from supplemental shakes to meet the total daily intake goal of  g/d protein. Patient understands the importance of being compliant with the diet and vitamin/mineral protocols, as well as the complications and risks that can occur if they are non-compliant. Patient has also been educated on the pre-op clear liquid diet protocol for 1 day prior to surgery. Patient understands that failure to comply with following the pre-op clear liquid diet could result in surgery being canceled. Patient's virtual appointment for 2 week post-op nutrition education has been scheduled. At this 2 week post-op visit, RD will assess how patient is tolerating liquids. If patient is tolerating liquid diet without difficulty, RD will recommend advancement of patient's diet to soft/moist (puree) diet to provider. Patient will also see RD again at 1-month post-op to assess tolerance of soft/moist diet and advance to soft protein with soft vegetables, if soft/moist diet is tolerated without difficulty. RD will assess patient's compliance with current protocol, including diet, vitamins/minerals, protein shakes, and physical activity. Post-op diet guidelines will be reinforced. Patient provided with RD contact information, and RD is available for questions and to meet with patient outside of the 2 week and 1 month post-op visit.      Candidate for surgery: Yes     Rebeca Plunkett RD

## 2023-02-21 NOTE — TELEPHONE ENCOUNTER
----- Message from Vaughn Lynch MD sent at 2/15/2017  6:04 PM EST -----  Pls report normal  labs Left arm;